# Patient Record
Sex: MALE | Race: WHITE | Employment: FULL TIME | URBAN - METROPOLITAN AREA
[De-identification: names, ages, dates, MRNs, and addresses within clinical notes are randomized per-mention and may not be internally consistent; named-entity substitution may affect disease eponyms.]

---

## 2017-08-10 ENCOUNTER — OFFICE VISIT (OUTPATIENT)
Dept: INTERNAL MEDICINE CLINIC | Age: 32
End: 2017-08-10

## 2017-08-10 VITALS
DIASTOLIC BLOOD PRESSURE: 85 MMHG | SYSTOLIC BLOOD PRESSURE: 125 MMHG | HEIGHT: 74 IN | HEART RATE: 64 BPM | RESPIRATION RATE: 18 BRPM | TEMPERATURE: 97.7 F | OXYGEN SATURATION: 100 % | BODY MASS INDEX: 28.38 KG/M2 | WEIGHT: 221.1 LBS

## 2017-08-10 DIAGNOSIS — Z00.00 ANNUAL PHYSICAL EXAM: Primary | ICD-10-CM

## 2017-08-10 DIAGNOSIS — K90.0 TRANSIENT GLUTEN SENSITIVITY: ICD-10-CM

## 2017-08-10 DIAGNOSIS — R79.89 LOW TESTOSTERONE LEVEL IN MALE: ICD-10-CM

## 2017-08-10 RX ORDER — GLUCOSAMINE SULFATE 1500 MG
POWDER IN PACKET (EA) ORAL DAILY
COMMUNITY
End: 2019-12-04

## 2017-08-10 RX ORDER — CALCIUM/MAGNESIUM/ZINC 333-133-5
TABLET ORAL DAILY
COMMUNITY
End: 2021-01-20 | Stop reason: ALTCHOICE

## 2017-08-10 RX ORDER — MULTIVITAMIN WITH IRON
1 TABLET ORAL DAILY
COMMUNITY
End: 2019-12-04

## 2017-08-10 NOTE — MR AVS SNAPSHOT
Visit Information Date & Time Provider Department Dept. Phone Encounter #  
 8/10/2017  8:00 AM Suzi Mueller MD [facility] and [address] [place] [address] [phone] [id number] Upcoming Health Maintenance Date Due DTaP/Tdap/Td series (1 - Tdap) 10/30/2008 INFLUENZA AGE 9 TO ADULT 8/1/2017 Allergies as of 8/10/2017  Review Complete On: 8/10/2017 By: Krys Banuelos No Known Allergies Current Immunizations  Never Reviewed No immunizations on file. Not reviewed this visit You Were Diagnosed With   
  
 Codes Comments Annual physical exam    -  Primary ICD-10-CM: Z00.00 ICD-9-CM: V70.0 Transient gluten sensitivity     ICD-10-CM: K90.0 ICD-9-CM: 995.3 Low testosterone level in male     ICD-10-CM: E29.1 ICD-9-CM: 257.2 Vitals BP Pulse Temp Resp Height(growth percentile) Weight(growth percentile) 125/85 (BP 1 Location: Right arm, BP Patient Position: Sitting) 64 97.7 °F (36.5 °C) (Oral) 18 6' 2\" (1.88 m) 221 lb 1.6 oz (100.3 kg) SpO2 BMI Smoking Status 100% 28.39 kg/m2 Never Smoker BMI and BSA Data Body Mass Index Body Surface Area  
 28.39 kg/m 2 2.29 m 2 Preferred Pharmacy Pharmacy Name Phone [facility] [address] [place] [address] [phone] Your Updated Medication List  
  
   
This list is accurate as of: 8/10/17  9:10 AM.  Always use your most recent med list.  
  
  
  
  
 calcium-magnesium-zinc 333-133-5 mg Tab Take  by mouth. DAILY MULTI-VITAMINS/IRON tablet Generic drug:  multivitamin with iron Take 1 Tab by mouth daily. VITAMIN D3 1,000 unit Cap Generic drug:  cholecalciferol Take  by mouth daily. We Performed the Following ALLERGEN PROFILE, FOOD-BASIC [id number] CPT(R)] ALLERGEN PROFILE, FOOD-SPICE [id number] CPT(R)] ALLERGEN, GLUTEN AB, IGE, QT [[id number] Custom] CBC WITH AUTOMATED DIFF [17147 CPT(R)] LIPID PANEL [71238 CPT(R)] METABOLIC PANEL, COMPREHENSIVE [57858 CPT(R)] DE COLLECTION VENOUS BLOOD,VENIPUNCTURE [id number] CPT(R)] TESTOSTERONE, FREE & TOTAL [32588 CPT(R)] TSH 3RD GENERATION [44707 CPT(R)] VITAMIN D, 25 HYDROXY [id number] CPT(R)] Patient Instructions Body Mass Index: Care Instructions Your Care Instructions Body mass index (BMI) can help you see if your weight is raising your risk for health problems. It uses a formula to compare how much you weigh with how tall you are. · A BMI lower than 18.5 is considered underweight. · A BMI between 18.5 and 24.9 is considered healthy. · A BMI between 25 and 29.9 is considered overweight. A BMI of 30 or higher is considered obese. · Your Body mass index is 28.39 kg/(m^2). Wt Readings from Last 3 Encounters:  
08/10/17 221 lb 1.6 oz (100.3 kg) · If your BMI is in the normal range, it means that you have a lower risk for weight-related health problems. If your BMI is in the overweight or obese range, you may be at increased risk for weight-related health problems, such as high blood pressure, heart disease, stroke, arthritis or joint pain, and diabetes. If your BMI is in the underweight range, you may be at increased risk for health problems such as fatigue, lower protection (immunity) against illness, muscle loss, bone loss, hair loss, and hormone problems. BMI is just one measure of your risk for weight-related health problems. You may be at higher risk for health problems if you are not active, you eat an unhealthy diet, or you drink too much alcohol or use tobacco products. Follow-up care is a key part of your treatment and safety. Be sure to make and go to all appointments, and call your doctor if you are having problems. It's also a good idea to know your test results and keep a list of the medicines you take. How can you care for yourself at home? · Practice healthy eating habits. This includes eating plenty of fruits, vegetables, whole grains, lean protein, and low-fat dairy. · If your doctor recommends it, get more exercise. Walking is a good choice. Bit by bit, increase the amount you walk every day. Try for at least 30 minutes on most days of the week. · Do not smoke. Smoking can increase your risk for health problems. If you need help quitting, talk to your doctor about stop-smoking programs and medicines. These can increase your chances of quitting for good. · Limit alcohol to 2 drinks a day for men and 1 drink a day for women. Too much alcohol can cause health problems. If you have a BMI higher than 25 · Your doctor may do other tests to check your risk for weight-related health problems. This may include measuring the distance around your waist. A waist measurement of more than 40 inches in men or 35 inches in women can increase the risk of weight-related health problems. · Talk with your doctor about steps you can take to stay healthy or improve your health. You may need to make lifestyle changes to lose weight and stay healthy, such as changing your diet and getting regular exercise. If you have a BMI lower than 18.5 · Your doctor may do other tests to check your risk for health problems. · Talk with your doctor about steps you can take to stay healthy or improve your health. You may need to make lifestyle changes to gain or maintain weight and stay healthy, such as getting more healthy foods in your diet and doing exercises to build muscle. Where can you learn more? Go to http://destinee-aurelia.info/. Enter S176 in the search box to learn more about \"Body Mass Index: Care Instructions. \" Current as of: January 23, 2017 Content Version: 11.3 © 0855-6335 [employer].  Care instructions adapted under license by [employer] (which disclaims liability or warranty for this information). If you have questions about a medical condition or this instruction, always ask your healthcare professional. [address] any warranty or liability for your use of this information. Introducing [facility]! Amber Colindres introduces [employer] patient portal. Now you can access parts of your medical record, email your doctor's office, and request medication refills online. 1. In your internet browser, go to [email or web address] 2. Click on the First Time User? Click Here link in the Sign In box. You will see the New Member Sign Up page. 3. Enter your Casinity Access Code exactly as it appears below. You will not need to use this code after youve completed the sign-up process. If you do not sign up before the expiration date, you must request a new code. · Casinity Access Code: [email or web address] Expires: 11/8/2017  9:10 AM 
 
4. Enter the last four digits of your Social Security Number (xxxx) and Date of Birth (mm/dd/yyyy) as indicated and click Submit. You will be taken to the next sign-up page. 5. Create a Casinity ID. This will be your Casinity login ID and cannot be changed, so think of one that is secure and easy to remember. 6. Create a Casinity password. You can change your password at any time. 7. Enter your Password Reset Question and Answer. This can be used at a later time if you forget your password. 8. Enter your e-mail address. You will receive e-mail notification when new information is available in [address]. 9. Click Sign Up. You can now view and download portions of your medical record. 10. Click the Download Summary menu link to download a portable copy of your medical information. If you have questions, please visit the Frequently Asked Questions section of the Casinity website. Remember, [employer]inity is NOT to be used for urgent needs. For medical emergencies, dial 911. Now available from your iPhone and Android! Please provide this summary of care documentation to your next provider. Your primary care clinician is listed as Chele Smith. If you have any questions after today's visit, please call [phone].

## 2017-08-10 NOTE — PATIENT INSTRUCTIONS
Body Mass Index: Care Instructions  Your Care Instructions    Body mass index (BMI) can help you see if your weight is raising your risk for health problems. It uses a formula to compare how much you weigh with how tall you are. · A BMI lower than 18.5 is considered underweight. · A BMI between 18.5 and 24.9 is considered healthy. · A BMI between 25 and 29.9 is considered overweight. A BMI of 30 or higher is considered obese. · Your Body mass index is 28.39 kg/(m^2). Wt Readings from Last 3 Encounters:   08/10/17 221 lb 1.6 oz (100.3 kg)   ·     If your BMI is in the normal range, it means that you have a lower risk for weight-related health problems. If your BMI is in the overweight or obese range, you may be at increased risk for weight-related health problems, such as high blood pressure, heart disease, stroke, arthritis or joint pain, and diabetes. If your BMI is in the underweight range, you may be at increased risk for health problems such as fatigue, lower protection (immunity) against illness, muscle loss, bone loss, hair loss, and hormone problems. BMI is just one measure of your risk for weight-related health problems. You may be at higher risk for health problems if you are not active, you eat an unhealthy diet, or you drink too much alcohol or use tobacco products. Follow-up care is a key part of your treatment and safety. Be sure to make and go to all appointments, and call your doctor if you are having problems. It's also a good idea to know your test results and keep a list of the medicines you take. How can you care for yourself at home? · Practice healthy eating habits. This includes eating plenty of fruits, vegetables, whole grains, lean protein, and low-fat dairy. · If your doctor recommends it, get more exercise. Walking is a good choice. Bit by bit, increase the amount you walk every day. Try for at least 30 minutes on most days of the week. · Do not smoke.  Smoking can increase your risk for health problems. If you need help quitting, talk to your doctor about stop-smoking programs and medicines. These can increase your chances of quitting for good. · Limit alcohol to 2 drinks a day for men and 1 drink a day for women. Too much alcohol can cause health problems. If you have a BMI higher than 25  · Your doctor may do other tests to check your risk for weight-related health problems. This may include measuring the distance around your waist. A waist measurement of more than 40 inches in men or 35 inches in women can increase the risk of weight-related health problems. · Talk with your doctor about steps you can take to stay healthy or improve your health. You may need to make lifestyle changes to lose weight and stay healthy, such as changing your diet and getting regular exercise. If you have a BMI lower than 18.5  · Your doctor may do other tests to check your risk for health problems. · Talk with your doctor about steps you can take to stay healthy or improve your health. You may need to make lifestyle changes to gain or maintain weight and stay healthy, such as getting more healthy foods in your diet and doing exercises to build muscle. Where can you learn more? Go to http://destinee-aurelia.info/. Enter S176 in the search box to learn more about \"Body Mass Index: Care Instructions. \"  Current as of: January 23, 2017  Content Version: 11.3  © 5619-1283 [employer]. Care instructions adapted under license by [employer] (which disclaims liability or warranty for this information). If you have questions about a medical condition or this instruction, always ask your healthcare professional. [place] [address] any warranty or liability for your use of this information.

## 2017-08-10 NOTE — PROGRESS NOTES
Mr. Lorenza Yang is a 36y.o. year old male who had concerns including Establish Care and Complete Physical.    HPI:  Chief Complaint   Patient presents with   [facility].    Complete Physical     Concerned with foods may be causing GI upset. Patient would like his testosterone and food allergies tested. Patient denies depression. No past medical history on file. Current Outpatient Prescriptions   Medication Sig Dispense    multivitamin with iron (DAILY MULTI-VITAMINS/IRON) tablet Take 1 Tab by mouth daily.  calcium-magnesium-zinc 333-133-5 mg tab Take  by mouth.  cholecalciferol (VITAMIN D3) 1,000 unit cap Take  by mouth daily. No current facility-administered medications for this visit. Reviewed PmHx, RxHx, FmHx, SocHx, AllgHx and updated and dated in the chart. ROS: Negative except for BOLD  General: fever, chills, fatigue  Respiratory: cough, SOB, wheezing  Cardiovascular:  CP, palpitation, WHITTEN, edema   Gastrointestinal: N/V/D, bleeding  Genito-Urinary: dysuria, hematuria  Musculoskeletal: muscle weakness, pain, swelling    OBJECTIVE:   Visit Vitals    /85 (BP 1 Location: Right arm, BP Patient Position: Sitting)    Pulse 64    Temp 97.7 °F (36.5 °C) (Oral)    Resp 18    Ht 6' 2\" (1.88 m)    Wt 221 lb 1.6 oz (100.3 kg)    SpO2 100%    BMI 28.39 kg/m2     GEN: The patient appears well, alert, oriented x 3, in no distress. ENT: bilateral TM and canal normal.  Neck supple. No adenopathy or thyromegaly. HEIDI. Lungs: clear bilaterally, good air entry, no wheezes, rhonchi or rales. Cardiovascular: regular rate and rhythm. S1 and S2 normal, no murmurs,  Abdomen: + BS, soft without tenderness, guarding, rebound, mass or organomegaly. Extremities: no edema, normal peripheral pulses. Neurological: normal, gross sensory and motor in tact without focal findings. Assessment/ Plan:       ICD-10-CM ICD-9-CM    1.  Annual physical exam Z00.00 V70.0 multivitamin with iron (DAILY MULTI-VITAMINS/IRON) tablet      calcium-magnesium-zinc 333-133-5 mg tab      cholecalciferol (VITAMIN D3) 1,000 unit cap      CBC WITH AUTOMATED DIFF      LIPID PANEL      METABOLIC PANEL, COMPREHENSIVE      KY COLLECTION VENOUS BLOOD,VENIPUNCTURE      TSH 3RD GENERATION      VITAMIN D, 25 HYDROXY      TESTOSTERONE, FREE & TOTAL   2. Transient gluten sensitivity K90.0 995.3 multivitamin with iron (DAILY MULTI-VITAMINS/IRON) tablet      calcium-magnesium-zinc 333-133-5 mg tab      cholecalciferol (VITAMIN D3) 1,000 unit cap      CBC WITH AUTOMATED DIFF      LIPID PANEL      METABOLIC PANEL, COMPREHENSIVE      KY COLLECTION VENOUS BLOOD,VENIPUNCTURE      TSH 3RD GENERATION      VITAMIN D, 25 HYDROXY      TESTOSTERONE, FREE & TOTAL      ALLERGEN PROFILE, FOOD-BASIC      ALLERGEN PROFILE, FOOD-SPICE      ALLERGEN, GLUTEN AB, IGE, QT   3. Low testosterone level in male E29.1 257.2 multivitamin with iron (DAILY MULTI-VITAMINS/IRON) tablet      calcium-magnesium-zinc 333-133-5 mg tab      cholecalciferol (VITAMIN D3) 1,000 unit cap      CBC WITH AUTOMATED DIFF      LIPID PANEL      METABOLIC PANEL, COMPREHENSIVE      KY COLLECTION VENOUS BLOOD,VENIPUNCTURE      TSH 3RD GENERATION      VITAMIN D, 25 HYDROXY      TESTOSTERONE, FREE & TOTAL           I have discussed the diagnosis with the patient and the intended plan as seen in the above orders. The patient has received an after-visit summary and questions were answered concerning future plans. Medication Side Effects and Warnings were discussed with patient.     Follow-up Disposition: Not on R Robbie Weller MD

## 2017-08-18 LAB
25(OH)D3+25(OH)D2 SERPL-MCNC: 31.9 NG/ML (ref 30–100)
ALBUMIN SERPL-MCNC: 5 G/DL (ref 3.5–5.5)
ALBUMIN/GLOB SERPL: 2 {RATIO} (ref 1.2–2.2)
ALP SERPL-CCNC: 71 IU/L (ref 39–117)
ALT SERPL-CCNC: 28 IU/L (ref 0–44)
AST SERPL-CCNC: 25 IU/L (ref 0–40)
BASOPHILS # BLD AUTO: 0 X10E3/UL (ref 0–0.2)
BASOPHILS NFR BLD AUTO: 0 %
BILIRUB SERPL-MCNC: 0.4 MG/DL (ref 0–1.2)
BUN SERPL-MCNC: 19 MG/DL (ref 6–20)
BUN/CREAT SERPL: 17 (ref 9–20)
CALCIUM SERPL-MCNC: 10.2 MG/DL (ref 8.7–10.2)
CHLORIDE SERPL-SCNC: 102 MMOL/L (ref 96–106)
CHOLEST SERPL-MCNC: 192 MG/DL (ref 100–199)
CO2 SERPL-SCNC: 23 MMOL/L (ref 18–29)
CODFISH IGE QN: <0.1 KU/L
COW MILK IGE QN: <0.1 KU/L
CREAT SERPL-MCNC: 1.1 MG/DL (ref 0.76–1.27)
EGG WHITE IGE QN: <0.1 KU/L
EOSINOPHIL # BLD AUTO: 0.1 X10E3/UL (ref 0–0.4)
EOSINOPHIL NFR BLD AUTO: 2 %
ERYTHROCYTE [DISTWIDTH] IN BLOOD BY AUTOMATED COUNT: 12.8 % (ref 12.3–15.4)
GLOBULIN SER CALC-MCNC: 2.5 G/DL (ref 1.5–4.5)
GLUCOSE SERPL-MCNC: 82 MG/DL (ref 65–99)
GLUTEN IGE QN: <0.1 KU/L
HCT VFR BLD AUTO: 47.5 % (ref 37.5–51)
HDLC SERPL-MCNC: 35 MG/DL
HGB BLD-MCNC: 16.7 G/DL (ref 12.6–17.7)
IMM GRANULOCYTES # BLD: 0 X10E3/UL (ref 0–0.1)
IMM GRANULOCYTES NFR BLD: 0 %
LDLC SERPL CALC-MCNC: 81 MG/DL (ref 0–99)
LYMPHOCYTES # BLD AUTO: 1.9 X10E3/UL (ref 0.7–3.1)
LYMPHOCYTES NFR BLD AUTO: 31 %
Lab: ABNORMAL
MCH RBC QN AUTO: 32.4 PG (ref 26.6–33)
MCHC RBC AUTO-ENTMCNC: 35.2 G/DL (ref 31.5–35.7)
MCV RBC AUTO: 92 FL (ref 79–97)
MONOCYTES # BLD AUTO: 0.5 X10E3/UL (ref 0.1–0.9)
MONOCYTES NFR BLD AUTO: 9 %
NEUTROPHILS # BLD AUTO: 3.4 X10E3/UL (ref 1.4–7)
NEUTROPHILS NFR BLD AUTO: 58 %
PEANUT IGE QN: <0.1 KU/L
PLATELET # BLD AUTO: 171 X10E3/UL (ref 150–379)
POTASSIUM SERPL-SCNC: 3.9 MMOL/L (ref 3.5–5.2)
PROT SERPL-MCNC: 7.5 G/DL (ref 6–8.5)
RBC # BLD AUTO: 5.16 X10E6/UL (ref 4.14–5.8)
SODIUM SERPL-SCNC: 144 MMOL/L (ref 134–144)
SOYBEAN IGE QN: 0.48 KU/L
SPECIMEN STATUS REPORT, ROLRST: NORMAL
TESTOST FREE SERPL-MCNC: NORMAL PG/ML
TESTOST SERPL-MCNC: 300 NG/DL (ref 264–916)
TRIGL SERPL-MCNC: 380 MG/DL (ref 0–149)
TSH SERPL DL<=0.005 MIU/L-ACNC: 2.65 UIU/ML (ref 0.45–4.5)
VLDLC SERPL CALC-MCNC: 76 MG/DL (ref 5–40)
WBC # BLD AUTO: 6 X10E3/UL (ref 3.4–10.8)
WHEAT IGE QN: <0.1 KU/L

## 2017-09-06 ENCOUNTER — TELEPHONE (OUTPATIENT)
Dept: INTERNAL MEDICINE CLINIC | Age: 32
End: 2017-09-06

## 2017-12-11 ENCOUNTER — OFFICE VISIT (OUTPATIENT)
Dept: INTERNAL MEDICINE CLINIC | Age: 32
End: 2017-12-11

## 2017-12-11 VITALS
RESPIRATION RATE: 18 BRPM | HEIGHT: 74 IN | OXYGEN SATURATION: 98 % | DIASTOLIC BLOOD PRESSURE: 85 MMHG | TEMPERATURE: 97.9 F | HEART RATE: 63 BPM | BODY MASS INDEX: 28.66 KG/M2 | SYSTOLIC BLOOD PRESSURE: 129 MMHG | WEIGHT: 223.3 LBS

## 2017-12-11 DIAGNOSIS — J02.9 SORE THROAT: ICD-10-CM

## 2017-12-11 DIAGNOSIS — J34.89 SINUS PRESSURE: Primary | ICD-10-CM

## 2017-12-11 RX ORDER — AMOXICILLIN 875 MG/1
875 TABLET, FILM COATED ORAL 2 TIMES DAILY
Qty: 14 TAB | Refills: 0 | Status: SHIPPED | OUTPATIENT
Start: 2017-12-11 | End: 2017-12-18

## 2017-12-11 NOTE — PROGRESS NOTES
Mr. Marisol Martínez is a 29y.o. year old male who had concerns including Sore Throat. HPI:  Chief Complaint   Patient presents with    Sore Throat     rm 5 patient believes he may have a sinus infection      Colorful drainage since Thursday. Fatigue, weakness. No fevers. History reviewed. No pertinent past medical history. Current Outpatient Prescriptions   Medication Sig Dispense    oxymetazoline (AFRIN NO DRIP,OXYMETAZOLIN,) 0.05 % mist 1 Spray by Nasal route two (2) times a day for 3 days. 15 mL    amoxicillin (AMOXIL) 875 mg tablet Take 1 Tab by mouth two (2) times a day for 7 days. 14 Tab    multivitamin with iron (DAILY MULTI-VITAMINS/IRON) tablet Take 1 Tab by mouth daily.  calcium-magnesium-zinc 333-133-5 mg tab Take  by mouth.  cholecalciferol (VITAMIN D3) 1,000 unit cap Take  by mouth daily. No current facility-administered medications for this visit. Reviewed PmHx, RxHx, FmHx, SocHx, AllgHx and updated and dated in the chart. ROS: Negative except for BOLD  General: fever, chills, fatigue  Respiratory: cough, SOB, wheezing  Cardiovascular:  CP, palpitation, WHITTEN, edema   Gastrointestinal: N/V/D, bleeding  Genito-Urinary: dysuria, hematuria  Musculoskeletal: muscle weakness, pain, swelling    OBJECTIVE:   Visit Vitals    /85 (BP 1 Location: Right arm, BP Patient Position: Sitting)    Pulse 63    Temp 97.9 °F (36.6 °C) (Oral)    Resp 18    Ht 6' 2\" (1.88 m)    Wt 223 lb 4.8 oz (101.3 kg)    SpO2 98%    BMI 28.67 kg/m2     GEN: The patient appears well, alert, oriented x 3, in no distress. ENT: bilateral TM and canal normal.  Neck supple. Min tender, shoddy, mobile ant cervical adenopathy, no thyromegaly. No conjunctivitis. Maxillary sinus tenderness present. Nasal turbinates erythema, mild swelling  Lungs: clear bilaterally, good air entry, no wheezes, rhonchi or rales. Cardiovascular: regular rate and rhythm.  S1 and S2 normal, no murmurs,  Abdomen: + BS, soft without tenderness, guarding, rebound, mass or organomegaly. Extremities: no edema, normal peripheral pulses. No cyanosis      Assessment/ Plan:       ICD-10-CM ICD-9-CM    1. Sinus pressure J34.89 478.19 oxymetazoline (AFRIN NO DRIP,OXYMETAZOLIN,) 0.05 % mist      amoxicillin (AMOXIL) 875 mg tablet   2. Sore throat J02.9 462        Sx care, afrin x 3 days, if persist or worsens, start abx then. No red flags. I have discussed the diagnosis with the patient and the intended plan as seen in the above orders. The patient has received an after-visit summary and questions were answered concerning future plans. Medication Side Effects and Warnings were discussed with patient.     Follow-up Disposition: Not on R Robbie Weller MD

## 2017-12-11 NOTE — MR AVS SNAPSHOT
Visit Information Date & Time Provider Department Dept. Phone Encounter #  
 12/11/2017  4:30 PM Quinn Coyne MD [facility] Sports Medicine and [address] [place] [address] [phone] [id number] Upcoming Health Maintenance Date Due DTaP/Tdap/Td series (2 - Td) 12/11/2027 Allergies as of 12/11/2017  Review Complete On: 12/11/2017 By: Rene Campuzano No Known Allergies Current Immunizations  Never Reviewed No immunizations on file. Not reviewed this visit You Were Diagnosed With   
  
 Codes Comments Sinus pressure    -  Primary ICD-10-CM: C33.40 ICD-9-CM: 478.19 Sore throat     ICD-10-CM: J02.9 ICD-9-CM: 947 Vitals BP Pulse Temp Resp Height(growth percentile) Weight(growth percentile) 129/85 (BP 1 Location: Right arm, BP Patient Position: Sitting) 63 97.9 °F (36.6 °C) (Oral) 18 6' 2\" (1.88 m) 223 lb 4.8 oz (101.3 kg) SpO2 BMI Smoking Status 98% 28.67 kg/m2 Never Smoker BMI and BSA Data Body Mass Index Body Surface Area  
 28.67 kg/m 2 2.3 m 2 Preferred Pharmacy Pharmacy Name Phone [facility] [address] [place] [address] 37 [address] [facility] [phone] Your Updated Medication List  
  
   
This list is accurate as of: 12/11/17  4:53 PM.  Always use your most recent med list.  
  
  
  
  
 amoxicillin 875 mg tablet Commonly known as:  AMOXIL Take 1 Tab by mouth two (2) times a day for 7 days. calcium-magnesium-zinc 333-133-5 mg Tab Take  by mouth. DAILY MULTI-VITAMINS/IRON tablet Generic drug:  multivitamin with iron Take 1 Tab by mouth daily. oxymetazoline 0.05 % Mist  
Commonly known as:  AFRIN NO DRIP(OXYMETAZOLIN) 1 Spray by Nasal route two (2) times a day for 3 days. VITAMIN D3 1,000 unit Cap Generic drug:  cholecalciferol Take  by mouth daily. Prescriptions Sent to Pharmacy Refills oxymetazoline (AFRIN NO DRIP,OXYMETAZOLIN,) 0.05 % mist 0 Si Spray by Nasal route two (2) times a day for 3 days. Class: Normal  
 Pharmacy: [address] AT [address] [facility] Ph #: [phone] Route: Nasal  
 amoxicillin (AMOXIL) 875 mg tablet 0 Sig: Take 1 Tab by mouth two (2) times a day for 7 days. Class: Normal  
 Pharmacy: [address] AT [address] [facility] Ph #: [phone] Route: Oral  
  
Patient Instructions Saline Nasal Washes: Care Instructions Your Care Instructions Saline nasal washes help keep the nasal passages open by washing out thick or dried mucus. This simple remedy can help relieve symptoms of allergies, sinusitis, and colds. It also can make the nose feel more comfortable by keeping the mucous membranes moist. You may notice a little burning sensation in your nose the first few times you use the solution, but this usually gets better in a few days. Follow-up care is a key part of your treatment and safety. Be sure to make and go to all appointments, and call your doctor if you are having problems. It's also a good idea to know your test results and keep a list of the medicines you take. How can you care for yourself at home? · You can buy premixed saline solution in a squeeze bottle or other sinus rinse products at a drugstore. Read and follow the instructions on the label. · You also can make your own saline solution by adding 1 teaspoon of salt and 1 teaspoon of baking soda to 2 cups of distilled water. · If you use a homemade solution, pour a small amount into a clean bowl. Using a rubber bulb syringe, squeeze the syringe and place the tip in the salt water. Pull a small amount of the salt water into the syringe by relaxing your hand. · Sit down with your head tilted slightly back. Do not lie down.  Put the tip of the bulb syringe or the squeeze bottle a little way into one of your nostrils. Gently drip or squirt a few drops into the nostril. Repeat with the other nostril. Some sneezing and gagging are normal at first. 
· Gently blow your nose. · Wipe the syringe or bottle tip clean after each use. · Repeat this 2 or 3 times a day. · Use nasal washes gently if you have nosebleeds often. When should you call for help? Watch closely for changes in your health, and be sure to contact your doctor if: 
? · You often get nosebleeds. ? · You have problems doing the nasal washes. Where can you learn more? Go to http://destinee-aurelia.info/. Enter [id number] in the search box to learn more about \"Saline Nasal Washes: Care Instructions. \" Current as of: May 12, 2017 Content Version: 11.4 © 9106-7646 [employer]. Care instructions adapted under license by [employer] (which disclaims liability or warranty for this information). If you have questions about a medical condition or this instruction, always ask your healthcare professional. [place] [address] any warranty or liability for your use of this information. Sinusitis: Care Instructions Your Care Instructions Sinusitis is an infection of the lining of the sinus cavities in your head. Sinusitis often follows a cold. It causes pain and pressure in your head and face. In most cases, sinusitis gets better on its own in 1 to 2 weeks. But some mild symptoms may last for several weeks. Sometimes antibiotics are needed. Follow-up care is a key part of your treatment and safety. Be sure to make and go to all appointments, and call your doctor if you are having problems. It's also a good idea to know your test results and keep a list of the medicines you take. How can you care for yourself at home?  
· Take an over-the-counter pain medicine, such as acetaminophen (Tylenol), ibuprofen (Advil, Motrin), or naproxen (Aleve). Read and follow all instructions on the label. · If the doctor prescribed antibiotics, take them as directed. Do not stop taking them just because you feel better. You need to take the full course of antibiotics. · Be careful when taking over-the-counter cold or flu medicines and Tylenol at the same time. Many of these medicines have acetaminophen, which is Tylenol. Read the labels to make sure that you are not taking more than the recommended dose. Too much acetaminophen (Tylenol) can be harmful. · Breathe warm, moist air from a steamy shower, a hot bath, or a sink filled with hot water. Avoid cold, dry air. Using a humidifier in your home may help. Follow the directions for cleaning the machine. · Use saline (saltwater) nasal washes to help keep your nasal passages open and wash out mucus and bacteria. You can buy saline nose drops at a grocery store or drugstore. Or you can make your own at home by adding 1 teaspoon of salt and 1 teaspoon of baking soda to 2 cups of distilled water. If you make your own, fill a bulb syringe with the solution, insert the tip into your nostril, and squeeze gently. Brielle Hals your nose. · Put a hot, wet towel or a warm gel pack on your face 3 or 4 times a day for 5 to 10 minutes each time. · Try a decongestant nasal spray like oxymetazoline (Afrin). Do not use it for more than 3 days in a row. Using it for more than 3 days can make your congestion worse. When should you call for help? Call your doctor now or seek immediate medical care if: 
? · You have new or worse swelling or redness in your face or around your eyes. ? · You have a new or higher fever. ? Watch closely for changes in your health, and be sure to contact your doctor if: 
? · You have new or worse facial pain. ? · The mucus from your nose becomes thicker (like pus) or has new blood in it. ? · You are not getting better as expected. Where can you learn more? Go to http://destinee-aurelia.info/. Enter C949 in the search box to learn more about \"Sinusitis: Care Instructions. \" Current as of: May 12, 2017 Content Version: 11.4 © 9896-2054 [employer]. Care instructions adapted under license by [employer] (which disclaims liability or warranty for this information). If you have questions about a medical condition or this instruction, always ask your healthcare professional. [place] [address] any warranty or liability for your use of this information. Introducing [facility] & HEALTH SERVICES! [employer] introduces EndoEvolution patient portal. Now you can access parts of your medical record, email your doctor's office, and request medication refills online. 1. In your internet browser, go to [email or web address] 2. Click on the First Time User? Click Here link in the Sign In box. You will see the New Member Sign Up page. 3. Enter your EndoEvolution Access Code exactly as it appears below. You will not need to use this code after youve completed the sign-up process. If you do not sign up before the expiration date, you must request a new code. · EndoEvolution Access Code: [email or web address] Expires: 3/11/2018  4:53 PM 
 
4. Enter the last four digits of your Social Security Number (xxxx) and Date of Birth (mm/dd/yyyy) as indicated and click Submit. You will be taken to the next sign-up page. 5. Create a EndoEvolution ID. This will be your EndoEvolution login ID and cannot be changed, so think of one that is secure and easy to remember. 6. Create a EndoEvolution password. You can change your password at any time. 7. Enter your Password Reset Question and Answer. This can be used at a later time if you forget your password. 8. Enter your e-mail address. You will receive e-mail notification when new information is available in [address] 9. Click Sign Up. You can now view and download portions of your medical record. 10. Click the Download Summary menu link to download a portable copy of your medical information. If you have questions, please visit the Frequently Asked Questions section of the "2,10E+07" website. Remember, "2,10E+07" is NOT to be used for urgent needs. For medical emergencies, dial 911. Now available from your iPhone and Android! Please provide this summary of care documentation to your next provider. Your primary care clinician is listed as Kaley White. If you have any questions after today's visit, please call [phone].

## 2017-12-11 NOTE — PATIENT INSTRUCTIONS
Saline Nasal Washes: Care Instructions  Your Care Instructions  Saline nasal washes help keep the nasal passages open by washing out thick or dried mucus. This simple remedy can help relieve symptoms of allergies, sinusitis, and colds. It also can make the nose feel more comfortable by keeping the mucous membranes moist. You may notice a little burning sensation in your nose the first few times you use the solution, but this usually gets better in a few days. Follow-up care is a key part of your treatment and safety. Be sure to make and go to all appointments, and call your doctor if you are having problems. It's also a good idea to know your test results and keep a list of the medicines you take. How can you care for yourself at home? · You can buy premixed saline solution in a squeeze bottle or other sinus rinse products at a drugstore. Read and follow the instructions on the label. · You also can make your own saline solution by adding 1 teaspoon of salt and 1 teaspoon of baking soda to 2 cups of distilled water. · If you use a homemade solution, pour a small amount into a clean bowl. Using a rubber bulb syringe, squeeze the syringe and place the tip in the salt water. Pull a small amount of the salt water into the syringe by relaxing your hand. · Sit down with your head tilted slightly back. Do not lie down. Put the tip of the bulb syringe or the squeeze bottle a little way into one of your nostrils. Gently drip or squirt a few drops into the nostril. Repeat with the other nostril. Some sneezing and gagging are normal at first.  · Gently blow your nose. · Wipe the syringe or bottle tip clean after each use. · Repeat this 2 or 3 times a day. · Use nasal washes gently if you have nosebleeds often. When should you call for help? Watch closely for changes in your health, and be sure to contact your doctor if:  ? · You often get nosebleeds. ? · You have problems doing the nasal washes.    Where can you learn more? Go to http://destinee-aurelia.info/. Enter [id number] [id number] [phone] in the search box to learn more about \"Saline Nasal Washes: Care Instructions. \"  Current as of: May 12, 2017  Content Version: 11.4  © 8000-9996 [employer]. Care instructions adapted under license by [employer] (which disclaims liability or warranty for this information). If you have questions about a medical condition or this instruction, always ask your healthcare professional. [address] any warranty or liability for your use of this information. Sinusitis: Care Instructions  Your Care Instructions    Sinusitis is an infection of the lining of the sinus cavities in your head. Sinusitis often follows a cold. It causes pain and pressure in your head and face. In most cases, sinusitis gets better on its own in 1 to 2 weeks. But some mild symptoms may last for several weeks. Sometimes antibiotics are needed. Follow-up care is a key part of your treatment and safety. Be sure to make and go to all appointments, and call your doctor if you are having problems. It's also a good idea to know your test results and keep a list of the medicines you take. How can you care for yourself at home? · Take an over-the-counter pain medicine, such as acetaminophen (Tylenol), ibuprofen (Advil, Motrin), or naproxen (Aleve). Read and follow all instructions on the label. · If the doctor prescribed antibiotics, take them as directed. Do not stop taking them just because you feel better. You need to take the full course of antibiotics. · Be careful when taking over-the-counter cold or flu medicines and Tylenol at the same time. Many of these medicines have acetaminophen, which is Tylenol. Read the labels to make sure that you are not taking more than the recommended dose. Too much acetaminophen (Tylenol) can be harmful.   · Breathe warm, moist air from a steamy shower, a hot bath, or a sink filled with hot water. Avoid cold, dry air. Using a humidifier in your home may help. Follow the directions for cleaning the machine. · Use saline (saltwater) nasal washes to help keep your nasal passages open and wash out mucus and bacteria. You can buy saline nose drops at a grocery store or drugstore. Or you can make your own at home by adding 1 teaspoon of salt and 1 teaspoon of baking soda to 2 cups of distilled water. If you make your own, fill a bulb syringe with the solution, insert the tip into your nostril, and squeeze gently. Coralyn [place] your nose. · Put a hot, wet towel or a warm gel pack on your face 3 or 4 times a day for 5 to 10 minutes each time. · Try a decongestant nasal spray like oxymetazoline (Afrin). Do not use it for more than 3 days in a row. Using it for more than 3 days can make your congestion worse. When should you call for help? Call your doctor now or seek immediate medical care if:  ? · You have new or worse swelling or redness in your face or around your eyes. ? · You have a new or higher fever. ? Watch closely for changes in your health, and be sure to contact your doctor if:  ? · You have new or worse facial pain. ? · The mucus from your nose becomes thicker (like pus) or has new blood in it. ? · You are not getting better as expected. Where can you learn more? Go to http://destinee-aurelia.info/. Enter S521 in the search box to learn more about \"Sinusitis: Care Instructions. \"  Current as of: May 12, 2017  Content Version: 11.4  © 5028-1035 [employer]. Care instructions adapted under license by [employer] (which disclaims liability or warranty for this information). If you have questions about a medical condition or this instruction, always ask your healthcare professional. [address] any warranty or liability for your use of this information.

## 2019-01-24 ENCOUNTER — HOSPITAL ENCOUNTER (INPATIENT)
Age: 34
LOS: 2 days | Discharge: HOME | DRG: 373 | End: 2019-01-27
Attending: EMERGENCY MEDICINE | Admitting: SURGERY
Payer: COMMERCIAL

## 2019-01-24 ENCOUNTER — HOSPITAL ENCOUNTER (OUTPATIENT)
Dept: CT IMAGING | Age: 34
Discharge: HOME | End: 2019-01-24
Attending: NURSE PRACTITIONER
Payer: COMMERCIAL

## 2019-01-24 ENCOUNTER — OFFICE VISIT (OUTPATIENT)
Dept: FAMILY MEDICINE CLINIC | Age: 34
End: 2019-01-24

## 2019-01-24 VITALS
DIASTOLIC BLOOD PRESSURE: 80 MMHG | TEMPERATURE: 98.7 F | RESPIRATION RATE: 16 BRPM | SYSTOLIC BLOOD PRESSURE: 135 MMHG | OXYGEN SATURATION: 99 % | HEART RATE: 75 BPM | WEIGHT: 205 LBS | HEIGHT: 74 IN | BODY MASS INDEX: 26.31 KG/M2

## 2019-01-24 DIAGNOSIS — R68.89 ABNORMAL TESTICULAR EXAM: ICD-10-CM

## 2019-01-24 DIAGNOSIS — R10.31 ACUTE BILATERAL LOWER ABDOMINAL PAIN: Primary | ICD-10-CM

## 2019-01-24 DIAGNOSIS — R10.32 ACUTE BILATERAL LOWER ABDOMINAL PAIN: ICD-10-CM

## 2019-01-24 DIAGNOSIS — K35.33 APPENDICITIS WITH ABSCESS: ICD-10-CM

## 2019-01-24 DIAGNOSIS — K37 APPENDICITIS, UNSPECIFIED APPENDICITIS TYPE: Primary | ICD-10-CM

## 2019-01-24 DIAGNOSIS — R10.31 ACUTE BILATERAL LOWER ABDOMINAL PAIN: ICD-10-CM

## 2019-01-24 DIAGNOSIS — R10.32 ACUTE BILATERAL LOWER ABDOMINAL PAIN: Primary | ICD-10-CM

## 2019-01-24 LAB
ANION GAP BLD CALC-SCNC: 15 MMOL/L (ref 10–20)
ANION GAP SERPL CALC-SCNC: 11 MMOL/L (ref 5–15)
BASOPHILS # BLD: 0 K/UL (ref 0–0.1)
BASOPHILS NFR BLD: 0 % (ref 0–1)
BILIRUB UR QL STRIP: NEGATIVE
BUN BLD-MCNC: 17 MG/DL (ref 9–20)
BUN SERPL-MCNC: 14 MG/DL (ref 6–20)
BUN/CREAT SERPL: 13 (ref 12–20)
CA-I BLD-MCNC: 1.07 MMOL/L (ref 1.12–1.32)
CALCIUM SERPL-MCNC: 9.6 MG/DL (ref 8.5–10.1)
CHLORIDE BLD-SCNC: 100 MMOL/L (ref 98–107)
CHLORIDE SERPL-SCNC: 102 MMOL/L (ref 97–108)
CO2 BLD-SCNC: 26 MMOL/L (ref 21–32)
CO2 SERPL-SCNC: 23 MMOL/L (ref 21–32)
COMMENT, HOLDF: NORMAL
COMMENT, HOLDF: NORMAL
CREAT BLD-MCNC: 0.9 MG/DL (ref 0.6–1.3)
CREAT SERPL-MCNC: 1.04 MG/DL (ref 0.7–1.3)
DIFFERENTIAL METHOD BLD: ABNORMAL
EOSINOPHIL # BLD: 0 K/UL (ref 0–0.4)
EOSINOPHIL NFR BLD: 0 % (ref 0–7)
ERYTHROCYTE [DISTWIDTH] IN BLOOD BY AUTOMATED COUNT: 11 % (ref 11.5–14.5)
GLUCOSE BLD-MCNC: 84 MG/DL (ref 65–100)
GLUCOSE SERPL-MCNC: 86 MG/DL (ref 65–100)
GLUCOSE UR-MCNC: NEGATIVE MG/DL
HCT VFR BLD AUTO: 42.2 % (ref 36.6–50.3)
HCT VFR BLD CALC: 43 % (ref 36.6–50.3)
HGB BLD-MCNC: 15.1 G/DL (ref 12.1–17)
IMM GRANULOCYTES # BLD AUTO: 0 K/UL (ref 0–0.04)
IMM GRANULOCYTES NFR BLD AUTO: 0 % (ref 0–0.5)
KETONES P FAST UR STRIP-MCNC: NEGATIVE MG/DL
LYMPHOCYTES # BLD: 1.2 K/UL (ref 0.8–3.5)
LYMPHOCYTES NFR BLD: 11 % (ref 12–49)
MCH RBC QN AUTO: 31.9 PG (ref 26–34)
MCHC RBC AUTO-ENTMCNC: 35.8 G/DL (ref 30–36.5)
MCV RBC AUTO: 89.2 FL (ref 80–99)
MONOCYTES # BLD: 1 K/UL (ref 0–1)
MONOCYTES NFR BLD: 9 % (ref 5–13)
NEUTS SEG # BLD: 9 K/UL (ref 1.8–8)
NEUTS SEG NFR BLD: 79 % (ref 32–75)
NRBC # BLD: 0 K/UL (ref 0–0.01)
NRBC BLD-RTO: 0 PER 100 WBC
PH UR STRIP: 8.5 [PH] (ref 4.6–8)
PLATELET # BLD AUTO: 192 K/UL (ref 150–400)
PMV BLD AUTO: 10.3 FL (ref 8.9–12.9)
POTASSIUM BLD-SCNC: 7.3 MMOL/L (ref 3.5–5.1)
POTASSIUM SERPL-SCNC: 3.6 MMOL/L (ref 3.5–5.1)
PROT UR QL STRIP: NEGATIVE
RBC # BLD AUTO: 4.73 M/UL (ref 4.1–5.7)
SAMPLES BEING HELD,HOLD: NORMAL
SAMPLES BEING HELD,HOLD: NORMAL
SERVICE CMNT-IMP: ABNORMAL
SODIUM BLD-SCNC: 133 MMOL/L (ref 136–145)
SODIUM SERPL-SCNC: 136 MMOL/L (ref 136–145)
SP GR UR STRIP: 1.01 (ref 1–1.03)
UA UROBILINOGEN AMB POC: ABNORMAL (ref 0.2–1)
URINALYSIS CLARITY POC: CLEAR
URINALYSIS COLOR POC: ABNORMAL
URINE BLOOD POC: NEGATIVE
URINE LEUKOCYTES POC: NEGATIVE
URINE NITRITES POC: NEGATIVE
WBC # BLD AUTO: 11.4 K/UL (ref 4.1–11.1)

## 2019-01-24 PROCEDURE — 74011000258 HC RX REV CODE- 258: Performed by: SURGERY

## 2019-01-24 PROCEDURE — 74011250636 HC RX REV CODE- 250/636: Performed by: SURGERY

## 2019-01-24 PROCEDURE — 80047 BASIC METABLC PNL IONIZED CA: CPT

## 2019-01-24 PROCEDURE — 74011000258 HC RX REV CODE- 258: Performed by: RADIOLOGY

## 2019-01-24 PROCEDURE — 99283 EMERGENCY DEPT VISIT LOW MDM: CPT

## 2019-01-24 PROCEDURE — 74011250637 HC RX REV CODE- 250/637: Performed by: SURGERY

## 2019-01-24 PROCEDURE — 74177 CT ABD & PELVIS W/CONTRAST: CPT

## 2019-01-24 PROCEDURE — 85025 COMPLETE CBC W/AUTO DIFF WBC: CPT

## 2019-01-24 PROCEDURE — 80048 BASIC METABOLIC PNL TOTAL CA: CPT

## 2019-01-24 PROCEDURE — 74011636320 HC RX REV CODE- 636/320: Performed by: RADIOLOGY

## 2019-01-24 PROCEDURE — 99218 HC RM OBSERVATION: CPT

## 2019-01-24 PROCEDURE — 96374 THER/PROPH/DIAG INJ IV PUSH: CPT

## 2019-01-24 PROCEDURE — 36415 COLL VENOUS BLD VENIPUNCTURE: CPT

## 2019-01-24 RX ORDER — NALOXONE HYDROCHLORIDE 0.4 MG/ML
0.4 INJECTION, SOLUTION INTRAMUSCULAR; INTRAVENOUS; SUBCUTANEOUS AS NEEDED
Status: DISCONTINUED | OUTPATIENT
Start: 2019-01-24 | End: 2019-01-27 | Stop reason: HOSPADM

## 2019-01-24 RX ORDER — SODIUM CHLORIDE 0.9 % (FLUSH) 0.9 %
10 SYRINGE (ML) INJECTION
Status: COMPLETED | OUTPATIENT
Start: 2019-01-24 | End: 2019-01-24

## 2019-01-24 RX ORDER — OXYCODONE AND ACETAMINOPHEN 5; 325 MG/1; MG/1
1 TABLET ORAL
Status: DISCONTINUED | OUTPATIENT
Start: 2019-01-24 | End: 2019-01-27 | Stop reason: HOSPADM

## 2019-01-24 RX ORDER — ENOXAPARIN SODIUM 100 MG/ML
40 INJECTION SUBCUTANEOUS EVERY 24 HOURS
Status: DISCONTINUED | OUTPATIENT
Start: 2019-01-25 | End: 2019-01-27 | Stop reason: HOSPADM

## 2019-01-24 RX ORDER — SODIUM CHLORIDE 0.9 % (FLUSH) 0.9 %
5-40 SYRINGE (ML) INJECTION EVERY 8 HOURS
Status: DISCONTINUED | OUTPATIENT
Start: 2019-01-24 | End: 2019-01-27 | Stop reason: HOSPADM

## 2019-01-24 RX ORDER — ACETAMINOPHEN 325 MG/1
650 TABLET ORAL
Status: DISCONTINUED | OUTPATIENT
Start: 2019-01-24 | End: 2019-01-27 | Stop reason: HOSPADM

## 2019-01-24 RX ORDER — FENTANYL CITRATE 50 UG/ML
25 INJECTION, SOLUTION INTRAMUSCULAR; INTRAVENOUS
Status: DISCONTINUED | OUTPATIENT
Start: 2019-01-24 | End: 2019-01-27 | Stop reason: HOSPADM

## 2019-01-24 RX ORDER — DIPHENHYDRAMINE HCL 25 MG
25 CAPSULE ORAL
Status: DISCONTINUED | OUTPATIENT
Start: 2019-01-24 | End: 2019-01-27 | Stop reason: HOSPADM

## 2019-01-24 RX ORDER — SODIUM CHLORIDE, SODIUM LACTATE, POTASSIUM CHLORIDE, CALCIUM CHLORIDE 600; 310; 30; 20 MG/100ML; MG/100ML; MG/100ML; MG/100ML
100 INJECTION, SOLUTION INTRAVENOUS CONTINUOUS
Status: DISCONTINUED | OUTPATIENT
Start: 2019-01-24 | End: 2019-01-27 | Stop reason: HOSPADM

## 2019-01-24 RX ORDER — ONDANSETRON 2 MG/ML
4 INJECTION INTRAMUSCULAR; INTRAVENOUS
Status: DISCONTINUED | OUTPATIENT
Start: 2019-01-24 | End: 2019-01-27 | Stop reason: HOSPADM

## 2019-01-24 RX ORDER — SODIUM CHLORIDE 0.9 % (FLUSH) 0.9 %
5-40 SYRINGE (ML) INJECTION AS NEEDED
Status: DISCONTINUED | OUTPATIENT
Start: 2019-01-24 | End: 2019-01-27 | Stop reason: HOSPADM

## 2019-01-24 RX ADMIN — FENTANYL CITRATE 25 MCG: 50 INJECTION, SOLUTION INTRAMUSCULAR; INTRAVENOUS at 19:26

## 2019-01-24 RX ADMIN — IOHEXOL 50 ML: 240 INJECTION, SOLUTION INTRATHECAL; INTRAVASCULAR; INTRAVENOUS; ORAL at 16:31

## 2019-01-24 RX ADMIN — Medication 10 ML: at 16:31

## 2019-01-24 RX ADMIN — SODIUM CHLORIDE 100 ML: 900 INJECTION, SOLUTION INTRAVENOUS at 16:31

## 2019-01-24 RX ADMIN — OXYCODONE AND ACETAMINOPHEN 1 TABLET: 5; 325 TABLET ORAL at 21:22

## 2019-01-24 RX ADMIN — IOPAMIDOL 100 ML: 755 INJECTION, SOLUTION INTRAVENOUS at 16:31

## 2019-01-24 RX ADMIN — SODIUM CHLORIDE, SODIUM LACTATE, POTASSIUM CHLORIDE, AND CALCIUM CHLORIDE 100 ML/HR: 600; 310; 30; 20 INJECTION, SOLUTION INTRAVENOUS at 20:43

## 2019-01-24 RX ADMIN — Medication 10 ML: at 20:44

## 2019-01-24 RX ADMIN — PIPERACILLIN SODIUM,TAZOBACTAM SODIUM 3.38 G: 3; .375 INJECTION, POWDER, FOR SOLUTION INTRAVENOUS at 20:43

## 2019-01-24 NOTE — PROGRESS NOTES
Chief Complaint   Patient presents with    New Patient    Abdominal Pain     pt states he has been having abdominal pain and fevers and back pains for over a week. states he has been all over the [place] with his job. may be related. \"REVIEWED RECORD IN PREPARATION FOR VISIT AND HAVE OBTAINED THE NECESSARY DOCUMENTATION\"  1. Have you been to the ER, urgent care clinic since your last visit? Hospitalized since your last visit? Yes Where: seen [facility] for abdominal cramps    2. Have you seen or consulted any other health care providers outside of the [address] [place] [address] since your last visit? Include any pap smears or colon screening.  Yes Reason for visit: same

## 2019-01-24 NOTE — PATIENT INSTRUCTIONS

## 2019-01-24 NOTE — H&P
Surgery History and Physical 
 
Subjective: Vic Lyle is a 34 y.o. male who began having RLQ abdominal pain about 10 days ago. After 2 days of pain [facility] told him to drink kambucha and avoid dairy. He felt slightly better but the pain did not go away. Over the past few days it got worse and last night he began having fevers. He has no Nausea or vomiting. No Diarrhea. He has never had anything similar in the past. As he was having fevers he was sent by PCP for workup . Patient Active Problem List  
 Diagnosis Date Noted  Appendicitis with abscess 01/24/2019  Sinus pressure 12/11/2017 History reviewed. No pertinent past medical history. Past Surgical History:  
Procedure Laterality Date  HX ACL RECONSTRUCTION Social History Tobacco Use  Smoking status: Never Smoker  Smokeless tobacco: Never Used Substance Use Topics  Alcohol use: Yes Alcohol/week: 3.6 oz Types: 4 Cans of beer, 2 Shots of liquor per week Frequency: 4 or more times a week Drinks per session: 1 or 2 Comment: 1-2 drinks nightly Family History Problem Relation Age of Onset  No Known Problems Mother  No Known Problems Father  No Known Problems Brother  Dementia Maternal Grandmother  No Known Problems Maternal Grandfather  No Known Problems Paternal Grandmother  No Known Problems Paternal Grandfather  Other Paternal Uncle   
     testicular cancer Prior to Admission medications Medication Sig Start Date End Date Taking? Authorizing Provider  
multivitamin with iron (DAILY MULTI-VITAMINS/IRON) tablet Take 1 Tab by mouth daily. Yes Provider, Historical  
calcium-magnesium-zinc 333-133-5 mg tab Take  by mouth. Yes Provider, Historical  
cholecalciferol (VITAMIN D3) 1,000 unit cap Take  by mouth daily. Yes Provider, Historical  
 
Allergies Allergen Reactions  Soy Other (comments) Review of Systems: Pertinent items are noted in the History of Present Illness. Objective:  
 
Visit Vitals /72 Pulse 93 Temp 98.7 °F (37.1 °C) Resp 18 SpO2 100% Physical Exam:   
GENERAL: alert, cooperative, no distress, appears stated age, EYE: negative, THROAT & NECK: normal, LUNG: clear to auscultation bilaterally, HEART: regular rate and rhythm, ABDOMEN: soft with mild RLQ tenderness no rebound, EXTREMITIES:  no edema, SKIN: Normal., NEUROLOGIC: negative, PSYCH: non focal 
 
Imaging:  images and reports reviewed CT- The appendix is enlarged measuring 14 mm with a thick enhancing 
wall. There is inflammatory stranding around the pancreas and thickening of the 
base of the cecum and the adjacent terminal ileum with a small complex 
collection measuring 3 cm in diameter near the tip of the cecum but not 
accessible to percutaneous drainage. Lab Review:   
Recent Results (from the past 24 hour(s)) AMB POC URINALYSIS DIP STICK AUTO W/O MICRO Collection Time: 01/24/19 10:50 AM  
Result Value Ref Range Color (UA POC) CIT Group Clarity (UA POC) Clear Glucose (UA POC) Negative Negative Bilirubin (UA POC) Negative Negative Ketones (UA POC) Negative Negative Specific gravity (UA POC) 1.015 1.001 - 1.035 Blood (UA POC) Negative Negative pH (UA POC) 8.5 (A) 4.6 - 8.0 Protein (UA POC) Negative Negative Urobilinogen (UA POC) 1 mg/dL 0.2 - 1 Nitrites (UA POC) Negative Negative Leukocyte esterase (UA POC) Negative Negative SAMPLES BEING HELD Collection Time: 01/24/19  5:20 PM  
Result Value Ref Range SAMPLES BEING HELD GREEN   
 COMMENT Add-on orders for these samples will be processed based on acceptable specimen integrity and analyte stability, which may vary by analyte. POC CHEM8 Collection Time: 01/24/19  5:27 PM  
Result Value Ref Range Calcium, ionized (POC) 1.07 (L) 1.12 - 1.32 mmol/L  Sodium (POC) 133 (L) 136 - 145 mmol/L  
 Potassium (POC) 7.3 (HH) 3.5 - 5.1 mmol/L Chloride (POC) 100 98 - 107 mmol/L  
 CO2 (POC) 26 21 - 32 mmol/L Anion gap (POC) 15 10 - 20 mmol/L Glucose (POC) 84 65 - 100 mg/dL BUN (POC) 17 9 - 20 mg/dL Creatinine (POC) 0.9 0.6 - 1.3 mg/dL GFRAA, POC >60 >60 ml/min/1.73m2 GFRNA, POC >60 >60 ml/min/1.73m2 Hematocrit (POC) 43 36.6 - 50.3 % Comment Comment Not Indicated. CBC WITH AUTOMATED DIFF Collection Time: 01/24/19  5:57 PM  
Result Value Ref Range WBC 11.4 (H) 4.1 - 11.1 K/uL  
 RBC 4.73 4.10 - 5.70 M/uL  
 HGB 15.1 12.1 - 17.0 g/dL HCT 42.2 36.6 - 50.3 % MCV 89.2 80.0 - 99.0 FL  
 MCH 31.9 26.0 - 34.0 PG  
 MCHC 35.8 30.0 - 36.5 g/dL  
 RDW 11.0 (L) 11.5 - 14.5 % PLATELET 108 225 - 258 K/uL MPV 10.3 8.9 - 12.9 FL  
 NRBC 0.0 0  WBC ABSOLUTE NRBC 0.00 0.00 - 0.01 K/uL NEUTROPHILS 79 (H) 32 - 75 % LYMPHOCYTES 11 (L) 12 - 49 % MONOCYTES 9 5 - 13 % EOSINOPHILS 0 0 - 7 % BASOPHILS 0 0 - 1 % IMMATURE GRANULOCYTES 0 0.0 - 0.5 % ABS. NEUTROPHILS 9.0 (H) 1.8 - 8.0 K/UL  
 ABS. LYMPHOCYTES 1.2 0.8 - 3.5 K/UL  
 ABS. MONOCYTES 1.0 0.0 - 1.0 K/UL  
 ABS. EOSINOPHILS 0.0 0.0 - 0.4 K/UL  
 ABS. BASOPHILS 0.0 0.0 - 0.1 K/UL  
 ABS. IMM. GRANS. 0.0 0.00 - 0.04 K/UL  
 DF AUTOMATED    
SAMPLES BEING HELD Collection Time: 01/24/19  5:57 PM  
Result Value Ref Range SAMPLES BEING HELD 1PST COMMENT Add-on orders for these samples will be processed based on acceptable specimen integrity and analyte stability, which may vary by analyte. METABOLIC PANEL, BASIC Collection Time: 01/24/19  6:13 PM  
Result Value Ref Range Sodium 136 136 - 145 mmol/L Potassium 3.6 3.5 - 5.1 mmol/L Chloride 102 97 - 108 mmol/L  
 CO2 23 21 - 32 mmol/L Anion gap 11 5 - 15 mmol/L Glucose 86 65 - 100 mg/dL BUN 14 6 - 20 MG/DL Creatinine 1.04 0.70 - 1.30 MG/DL  
 BUN/Creatinine ratio 13 12 - 20 GFR est AA >60 >60 ml/min/1.73m2 GFR est non-AA >60 >60 ml/min/1.73m2 Calcium 9.6 8.5 - 10.1 MG/DL Assessment:Plan Phlegmenous Appendicitis Because of the phlegmon will be unable to perform Appendectomy at this time due to the risk of complication Will admit for IV abx May need repeat CT scan in a few days Eventually will need Lap Appendectomy Signed By: Daniel Harvey MD   
 January 24, 2019

## 2019-01-24 NOTE — PROGRESS NOTES
Admission Medication Reconciliation: 
 
Information obtained from: patient Significant PMH/Disease States:  
History reviewed. No pertinent past medical history. Chief Complaint for this Admission:  abdominal pain Allergies:  Soy Prior to Admission Medications:  
Prior to Admission Medications Prescriptions Last Dose Informant Patient Reported? Taking?  
calcium-magnesium-zinc 333-133-5 mg tab   Yes Yes Sig: Take  by mouth. cholecalciferol (VITAMIN D3) 1,000 unit cap   Yes Yes Sig: Take  by mouth daily. multivitamin with iron (DAILY MULTI-VITAMINS/IRON) tablet   Yes Yes Sig: Take 1 Tab by mouth daily. Facility-Administered Medications: None Comments/Recommendations: Medication review done with patient. No regular medications. He takes occasional nutritional supplements. No known med allergies.

## 2019-01-24 NOTE — PROGRESS NOTES
[address] [facility] Note  HPI:   Tracy Limon is a 34 y.o. male who presents to [facility] care. Chief Complaint   Patient presents with    New Patient    Abdominal Pain     pt states he has been having abdominal pain and fevers and back pains for over a week. Travels often for work, may be related. Tracy Limon is an 34 y.o. male who presents for evaluation of abdominal pain. The pain began suddenly. Onset was 10 days ago. Improved somewhat after following bland diet for a few days. Symptoms returned 3 days ago have been unchanged since. Pain is described as a moderate to severe, constant. Described as aching but can be sharp at times. Pain is located in the general abdomen, mostly bilateraly lower abdomen. Radiation of pain into the groin and testicles, as well as genearlized low back aching. Aggravating factors: eating, especially fatty foods/dairy foods. Unrelated to activity. Alleviating factors: none. Associated symptoms: intermittent fever (103 F last night), a little bloated, generalized malaise, looser stools. Feels hungry but cannot tolerate large meal. No acid reflux, significant nausea, no vominit. Looser stools but no diarrhea. No hematochezia, a little darker stool but has been taking Pepto bismol. 5 pound weight loss since onset. Patient denies any associated reflux. .  Patient denies heavy ETOH, NSAIDs, or recent change in medications. He does drink 2 drinks nightly. Recently , recent travel to [place] a few weeks ago for his wedding. [place] [employer] [place].  for work many months ago. Family history of testicular cancer and he is worried this could be a cause of his pain. Current Outpatient Medications   Medication Sig Dispense Refill    multivitamin with iron (DAILY MULTI-VITAMINS/IRON) tablet Take 1 Tab by mouth daily.  calcium-magnesium-zinc 333-133-5 mg tab Take  by mouth.       cholecalciferol (VITAMIN D3) 1,000 unit cap Take  by mouth daily. No Known Allergies   Patient Active Problem List   Diagnosis Code    Sinus pressure J34.89     History reviewed. No pertinent past medical history. Past Surgical History:   Procedure Laterality Date    HX ACL RECONSTRUCTION        No LMP for male patient. Family History   Problem Relation Age of Onset    No Known Problems Mother     No Known Problems Father     No Known Problems Brother     Dementia Maternal Grandmother     No Known Problems Maternal Grandfather     No Known Problems Paternal Grandmother     No Known Problems Paternal Grandfather     Other Paternal Uncle         testicular cancer      Social History     Socioeconomic History    Marital status: SINGLE     Spouse name: Not on file    Number of children: Not on file    Years of education: Not on file    Highest education level: Not on file   Social Needs    Financial resource strain: Not on file    Food insecurity - worry: Not on file    Food insecurity - inability: Not on file   [employer] needs - medical: Not on file   [employer] needs - non-medical: Not on file   Occupational History    Not on file   Tobacco Use    Smoking status: Never Smoker    Smokeless tobacco: Never Used   Substance and Sexual Activity    Alcohol use: Yes     Alcohol/week: 3.6 oz     Types: 4 Cans of beer, 2 Shots of liquor per week     Frequency: 4 or more times a week     Drinks per session: 1 or 2     Comment: 1-2 drinks nightly    Drug use: No    Sexual activity: Yes     Partners: Female     Birth control/protection: None   Other Topics Concern    Not on file   Social History Narrative    Not on file        ROS:   Review of Systems   Constitutional: Positive for fever, malaise/fatigue and weight loss. Negative for chills and diaphoresis. HENT: Negative for congestion, ear pain, hearing loss, sinus pain, sore throat and tinnitus. Eyes: Negative for blurred vision and double vision.    Respiratory: Negative for shortness of breath. Cardiovascular: Negative for chest pain, palpitations and leg swelling. Gastrointestinal: Positive for abdominal pain, melena and nausea. Negative for blood in stool, constipation, diarrhea, heartburn and vomiting. Genitourinary: Negative for dysuria, frequency, hematuria and urgency. Musculoskeletal: Negative for joint pain and myalgias. Skin: Negative for itching and rash. Neurological: Negative for dizziness, tingling, weakness and headaches. Endo/Heme/Allergies: Negative for polydipsia. Psychiatric/Behavioral: Negative. Physical Exam:     Visit Vitals  /80 (BP 1 Location: Right arm, BP Patient Position: Sitting)   Pulse 75   Temp 98.7 °F (37.1 °C) (Oral)   Resp 16   Ht 6' 2\" (1.88 m)   Wt 205 lb (93 kg)   SpO2 99%   BMI 26.32 kg/m²        Vitals and Nurse Documentation reviewed. Physical Exam   Constitutional: He is oriented to person, place, and time and well-developed, well-nourished, and in no distress. Vital signs are normal.   HENT:   Head: Normocephalic and atraumatic. Right Ear: Hearing, tympanic membrane, external ear and ear canal normal. Tympanic membrane is not erythematous. No middle ear effusion. Left Ear: Hearing, external ear and ear canal normal. Tympanic membrane is not erythematous. No middle ear effusion. Nose: Nose normal. No nasal deformity or septal deviation. Mouth/Throat: Uvula is midline and oropharynx is clear and moist. Mucous membranes are not pale, not dry and not cyanotic. Normal dentition. No dental caries. No oropharyngeal exudate. Eyes: Conjunctivae and lids are normal. Pupils are equal, round, and reactive to light. Right conjunctiva is not injected. Left conjunctiva is not injected. Neck: Normal range of motion and phonation normal. Neck supple. No thyroid mass present. Cardiovascular: Normal rate, regular rhythm, S1 normal, S2 normal, normal heart sounds and intact distal pulses.  Exam reveals no gallop and no friction rub. No murmur heard. Pulmonary/Chest: Effort normal and breath sounds normal. No accessory muscle usage. No respiratory distress. He has no decreased breath sounds. He has no wheezes. He has no rhonchi. He has no rales. He exhibits no tenderness. Abdominal: Soft. Normal appearance and bowel sounds are normal. He exhibits no distension and no mass. There is no hepatosplenomegaly. There is tenderness in the right upper quadrant and right lower quadrant. There is no rigidity, no rebound, no guarding, no tenderness at McBurney's point and negative Alvarez's sign. Significant tenderness of bilateral lower abdomen right greater than left. Genitourinary: Penis normal. He exhibits no testicular tenderness, no abnormal scrotal mass and no scrotal tenderness. Genitourinary Comments: Small nodule approximately 3 mm x 3 mm noted on top of right testicle, non-tender, soft, mobile. Musculoskeletal: Normal range of motion. He exhibits no edema, tenderness or deformity. Generalized mild tenderness to palpation of bilateral lower back. Lymphadenopathy:        Head (right side): No submental, no submandibular, no tonsillar, no preauricular, no posterior auricular and no occipital adenopathy present. Head (left side): No submental, no submandibular, no tonsillar, no preauricular, no posterior auricular and no occipital adenopathy present. He has no cervical adenopathy. Right: No inguinal and no supraclavicular adenopathy present. Left: No inguinal and no supraclavicular adenopathy present. Neurological: He is alert and oriented to person, place, and time. He has normal strength. Gait normal.   Skin: Skin is warm, dry and intact. No rash noted. He is not diaphoretic. No cyanosis. Nails show no clubbing. Psychiatric: Mood and affect normal.   Nursing note and vitals reviewed.         Assessment/ Plan:   [employer] were discussed including hours of operation, on-call providers, and [facility] Diagnoses and all orders for this visit:    1. Acute bilateral lower abdominal pain: Acute onset bilateral lower abdominal pain right greater than left with radiation to groin and low back, with associated fevers, malaise, weight loss. Stat CT of abdomen today to rule out appendicitis. Other differentials include colitis, possible pancreatitis with frequent ETOH use. Will obtain labs for further evaluation. Treatment based on results of imaging/labs. Discussed red flags that would prompt urgent medical evaluation. Close follow-up in 1 week or sooner if needed. -     AMB POC URINALYSIS DIP STICK AUTO W/O MICRO  -     CBC WITH AUTOMATED DIFF  -     METABOLIC PANEL, COMPREHENSIVE  -     LIPASE  -     CT ABD PELV W CONT; Future    2. Abnormal testicular exam: Incidental finding of small nodule of right testicle, likely unrelated to current illness. He is agreeable to referral to urology for further evaluation.   -     REFERRAL TO UROLOGY      Follow-up Disposition:  Return in about 1 week (around 1/31/2019), or if symptoms worsen or fail to improve.      Discussed expected course/resolution/complications of diagnosis in detail with patient.    Medication risks/benefits/costs/interactions/alternatives discussed with patient.    Pt was given an after visit summary which includes diagnoses, current medications & vitals.  Pt expressed understanding with the diagnosis and plan

## 2019-01-24 NOTE — ED PROVIDER NOTES
35 yo male presents to ER for evaluation of abdominal pain. Pt began with abdominal pain Tuesday through Thursday. Then felt better until Monday. Since then patient reports increasing pain. Pt has had fever last night. Pain in lower abdomen into back. No nausea or vomiting. No diarrhea. No urinary symptoms. Pt has tried ibuprofen last night, mild relief. No medicines today. No cp, sob, dizziness. Social hx 
 
nonsmoker The history is provided by the patient. No past medical history on file. Past Surgical History:  
Procedure Laterality Date  HX ACL RECONSTRUCTION Family History:  
Problem Relation Age of Onset  No Known Problems Mother  No Known Problems Father  No Known Problems Brother  Dementia Maternal Grandmother  No Known Problems Maternal Grandfather  No Known Problems Paternal Grandmother  No Known Problems Paternal Grandfather  Other Paternal Uncle   
     testicular cancer Social History Socioeconomic History  Marital status:  Spouse name: Not on file  Number of children: Not on file  Years of education: Not on file  Highest education level: Not on file Social Needs  Financial resource strain: Not on file  Food insecurity - worry: Not on file  Food insecurity - inability: Not on file  Transportation needs - medical: Not on file  Transportation needs - non-medical: Not on file Occupational History  Not on file Tobacco Use  Smoking status: Never Smoker  Smokeless tobacco: Never Used Substance and Sexual Activity  Alcohol use: Yes Alcohol/week: 3.6 oz Types: 4 Cans of beer, 2 Shots of liquor per week Frequency: 4 or more times a week Drinks per session: 1 or 2 Comment: 1-2 drinks nightly  Drug use: No  
 Sexual activity: Yes  
  Partners: Female Birth control/protection: None Other Topics Concern  Not on file Social History Narrative  Not on file ALLERGIES: Patient has no known allergies. Review of Systems There were no vitals filed for this visit. Physical Exam  
Constitutional: He is oriented to person, place, and time. He appears well-developed and well-nourished. No distress. HENT:  
Head: Normocephalic and atraumatic. Right Ear: External ear normal.  
Left Ear: External ear normal.  
Eyes: Conjunctivae and EOM are normal. Pupils are equal, round, and reactive to light. Neck: Normal range of motion. Neck supple. Cardiovascular: Normal rate, regular rhythm and normal heart sounds. Pulmonary/Chest: Effort normal and breath sounds normal. No respiratory distress. He has no wheezes. Abdominal: Soft. Normal appearance and bowel sounds are normal. He exhibits no shifting dullness, no distension, no pulsatile liver, no fluid wave, no abdominal bruit, no ascites, no pulsatile midline mass and no mass. There is no hepatosplenomegaly, splenomegaly or hepatomegaly. There is tenderness. There is no rigidity, no rebound, no guarding, no CVA tenderness, no tenderness at McBurney's point and negative Alvarez's sign. Abdomen exposed for exam. 
Pt tender lower abdomen No peritoneal signs Musculoskeletal: Normal range of motion. He exhibits no edema or tenderness. Neurological: He is alert and oriented to person, place, and time. He has normal reflexes. He displays normal reflexes. No cranial nerve deficit. Coordination normal.  
Skin: Skin is warm and dry. No rash noted. No erythema. Psychiatric: He has a normal mood and affect. His behavior is normal. Judgment and thought content normal.  
Nursing note and vitals reviewed. MDM Number of Diagnoses or Management Options Appendicitis, unspecified appendicitis type:  
Diagnosis management comments: 35 yo male with appendicitis on CT. No distress. No peritoneal signs P: labs, general surgery consult.  
 
5:27 PM 
 Pt case including HPI, PE, and all available lab and radiology results has been discussed with Dr. Eduard Domingo. He will see patient in ER Procedures

## 2019-01-25 PROBLEM — K35.32 PERFORATED APPENDICITIS: Status: ACTIVE | Noted: 2019-01-25

## 2019-01-25 LAB
ALBUMIN SERPL-MCNC: 4.8 G/DL (ref 3.5–5.5)
ALBUMIN/GLOB SERPL: 1.7 {RATIO} (ref 1.2–2.2)
ALP SERPL-CCNC: 75 IU/L (ref 39–117)
ALT SERPL-CCNC: 22 IU/L (ref 0–44)
ANION GAP SERPL CALC-SCNC: 9 MMOL/L (ref 5–15)
AST SERPL-CCNC: 13 IU/L (ref 0–40)
BASOPHILS # BLD AUTO: 0 X10E3/UL (ref 0–0.2)
BASOPHILS # BLD: 0 K/UL (ref 0–0.1)
BASOPHILS NFR BLD AUTO: 0 %
BASOPHILS NFR BLD: 0 % (ref 0–1)
BILIRUB SERPL-MCNC: 1.3 MG/DL (ref 0–1.2)
BUN SERPL-MCNC: 12 MG/DL (ref 6–20)
BUN SERPL-MCNC: 13 MG/DL (ref 6–20)
BUN/CREAT SERPL: 11 (ref 12–20)
BUN/CREAT SERPL: 13 (ref 9–20)
CALCIUM SERPL-MCNC: 10.3 MG/DL (ref 8.7–10.2)
CALCIUM SERPL-MCNC: 9 MG/DL (ref 8.5–10.1)
CHLORIDE SERPL-SCNC: 100 MMOL/L (ref 96–106)
CHLORIDE SERPL-SCNC: 104 MMOL/L (ref 97–108)
CO2 SERPL-SCNC: 23 MMOL/L (ref 20–29)
CO2 SERPL-SCNC: 24 MMOL/L (ref 21–32)
CREAT SERPL-MCNC: 1.03 MG/DL (ref 0.76–1.27)
CREAT SERPL-MCNC: 1.12 MG/DL (ref 0.7–1.3)
DIFFERENTIAL METHOD BLD: ABNORMAL
EOSINOPHIL # BLD AUTO: 0.1 X10E3/UL (ref 0–0.4)
EOSINOPHIL # BLD: 0.1 K/UL (ref 0–0.4)
EOSINOPHIL NFR BLD AUTO: 1 %
EOSINOPHIL NFR BLD: 1 % (ref 0–7)
ERYTHROCYTE [DISTWIDTH] IN BLOOD BY AUTOMATED COUNT: 11.2 % (ref 11.5–14.5)
ERYTHROCYTE [DISTWIDTH] IN BLOOD BY AUTOMATED COUNT: 12.3 % (ref 12.3–15.4)
GLOBULIN SER CALC-MCNC: 2.8 G/DL (ref 1.5–4.5)
GLUCOSE SERPL-MCNC: 88 MG/DL (ref 65–100)
GLUCOSE SERPL-MCNC: 88 MG/DL (ref 65–99)
HCT VFR BLD AUTO: 42.1 % (ref 36.6–50.3)
HCT VFR BLD AUTO: 44.7 % (ref 37.5–51)
HGB BLD-MCNC: 14.5 G/DL (ref 12.1–17)
HGB BLD-MCNC: 15.8 G/DL (ref 13–17.7)
IMM GRANULOCYTES # BLD AUTO: 0 K/UL (ref 0–0.04)
IMM GRANULOCYTES # BLD AUTO: 0 X10E3/UL (ref 0–0.1)
IMM GRANULOCYTES NFR BLD AUTO: 0 %
IMM GRANULOCYTES NFR BLD AUTO: 0 % (ref 0–0.5)
LIPASE SERPL-CCNC: 18 U/L (ref 13–78)
LYMPHOCYTES # BLD AUTO: 1.2 X10E3/UL (ref 0.7–3.1)
LYMPHOCYTES # BLD: 1.8 K/UL (ref 0.8–3.5)
LYMPHOCYTES NFR BLD AUTO: 12 %
LYMPHOCYTES NFR BLD: 17 % (ref 12–49)
MCH RBC QN AUTO: 31 PG (ref 26–34)
MCH RBC QN AUTO: 32 PG (ref 26.6–33)
MCHC RBC AUTO-ENTMCNC: 34.4 G/DL (ref 30–36.5)
MCHC RBC AUTO-ENTMCNC: 35.3 G/DL (ref 31.5–35.7)
MCV RBC AUTO: 90.1 FL (ref 80–99)
MCV RBC AUTO: 91 FL (ref 79–97)
MONOCYTES # BLD AUTO: 1.3 X10E3/UL (ref 0.1–0.9)
MONOCYTES # BLD: 1.2 K/UL (ref 0–1)
MONOCYTES NFR BLD AUTO: 12 %
MONOCYTES NFR BLD: 12 % (ref 5–13)
NEUTROPHILS # BLD AUTO: 7.7 X10E3/UL (ref 1.4–7)
NEUTROPHILS NFR BLD AUTO: 75 %
NEUTS SEG # BLD: 7.3 K/UL (ref 1.8–8)
NEUTS SEG NFR BLD: 69 % (ref 32–75)
NRBC # BLD: 0 K/UL (ref 0–0.01)
NRBC BLD-RTO: 0 PER 100 WBC
PLATELET # BLD AUTO: 162 K/UL (ref 150–400)
PLATELET # BLD AUTO: 200 X10E3/UL (ref 150–379)
PMV BLD AUTO: 10.3 FL (ref 8.9–12.9)
POTASSIUM SERPL-SCNC: 4 MMOL/L (ref 3.5–5.1)
POTASSIUM SERPL-SCNC: 4.3 MMOL/L (ref 3.5–5.2)
PROT SERPL-MCNC: 7.6 G/DL (ref 6–8.5)
RBC # BLD AUTO: 4.67 M/UL (ref 4.1–5.7)
RBC # BLD AUTO: 4.93 X10E6/UL (ref 4.14–5.8)
SODIUM SERPL-SCNC: 137 MMOL/L (ref 136–145)
SODIUM SERPL-SCNC: 141 MMOL/L (ref 134–144)
WBC # BLD AUTO: 10.3 X10E3/UL (ref 3.4–10.8)
WBC # BLD AUTO: 10.5 K/UL (ref 4.1–11.1)

## 2019-01-25 PROCEDURE — 74011250637 HC RX REV CODE- 250/637: Performed by: SURGERY

## 2019-01-25 PROCEDURE — 36415 COLL VENOUS BLD VENIPUNCTURE: CPT

## 2019-01-25 PROCEDURE — 99218 HC RM OBSERVATION: CPT

## 2019-01-25 PROCEDURE — 80048 BASIC METABOLIC PNL TOTAL CA: CPT

## 2019-01-25 PROCEDURE — 65270000029 HC RM PRIVATE

## 2019-01-25 PROCEDURE — 74011000258 HC RX REV CODE- 258: Performed by: SURGERY

## 2019-01-25 PROCEDURE — 74011250636 HC RX REV CODE- 250/636: Performed by: SURGERY

## 2019-01-25 PROCEDURE — 85025 COMPLETE CBC W/AUTO DIFF WBC: CPT

## 2019-01-25 RX ADMIN — SODIUM CHLORIDE, SODIUM LACTATE, POTASSIUM CHLORIDE, AND CALCIUM CHLORIDE 100 ML/HR: 600; 310; 30; 20 INJECTION, SOLUTION INTRAVENOUS at 13:53

## 2019-01-25 RX ADMIN — Medication 10 ML: at 13:52

## 2019-01-25 RX ADMIN — DIPHENHYDRAMINE HYDROCHLORIDE 25 MG: 25 CAPSULE ORAL at 00:13

## 2019-01-25 RX ADMIN — PIPERACILLIN SODIUM,TAZOBACTAM SODIUM 3.38 G: 3; .375 INJECTION, POWDER, FOR SOLUTION INTRAVENOUS at 05:00

## 2019-01-25 RX ADMIN — OXYCODONE AND ACETAMINOPHEN 1 TABLET: 5; 325 TABLET ORAL at 05:56

## 2019-01-25 RX ADMIN — PIPERACILLIN SODIUM,TAZOBACTAM SODIUM 3.38 G: 3; .375 INJECTION, POWDER, FOR SOLUTION INTRAVENOUS at 18:41

## 2019-01-25 RX ADMIN — ENOXAPARIN SODIUM 40 MG: 40 INJECTION SUBCUTANEOUS at 15:37

## 2019-01-25 RX ADMIN — OXYCODONE AND ACETAMINOPHEN 1 TABLET: 5; 325 TABLET ORAL at 18:40

## 2019-01-25 RX ADMIN — OXYCODONE AND ACETAMINOPHEN 1 TABLET: 5; 325 TABLET ORAL at 01:42

## 2019-01-25 RX ADMIN — OXYCODONE AND ACETAMINOPHEN 1 TABLET: 5; 325 TABLET ORAL at 11:29

## 2019-01-25 RX ADMIN — PIPERACILLIN SODIUM,TAZOBACTAM SODIUM 3.38 G: 3; .375 INJECTION, POWDER, FOR SOLUTION INTRAVENOUS at 11:30

## 2019-01-25 NOTE — PROGRESS NOTES
· Surgery Progress Note 1/25/2019 Admit Date: 1/24/2019 Subjective:  
 
  Pt has complaint of some notable lower abdominal pain after percocet wears off but other wise feels ok, is hungry . Pts pain present - adequately treated. No SOB. No CP. Pt is ambulating. Patient 's current diet is Clear Liquid. . Pt reports  no nausea and no vomiting. Pt reports no fever or chills Bowel Movements: a bit loose, yesterday Objective:  
 
Blood pressure 121/68, pulse 67, temperature 98.3 °F (36.8 °C), resp. rate 18, SpO2 97 %. No intake/output data recorded. 01/23 1901 - 01/25 0700 In: 288.3 [I.V.:288.3] Out: 1400 [[facility]:[id number]] General appearance: alert, cooperative, no distress, appears stated age Lungs: clear to auscultation bilaterally Heart: regular rate and rhythm Abd:soft, protuberant, nondistended, tenderness mild - in the periumbilical area, without guarding, without rebound Extremities: no edema Neurologic: Grossly normal 
 
Data Review Recent Results (from the past 12 hour(s)) CBC WITH AUTOMATED DIFF Collection Time: 01/25/19  5:30 AM  
Result Value Ref Range WBC 10.5 4.1 - 11.1 K/uL  
 RBC 4.67 4.10 - 5.70 M/uL  
 HGB 14.5 12.1 - 17.0 g/dL HCT 42.1 36.6 - 50.3 % MCV 90.1 80.0 - 99.0 FL  
 MCH 31.0 26.0 - 34.0 PG  
 MCHC 34.4 30.0 - 36.5 g/dL  
 RDW 11.2 (L) 11.5 - 14.5 % PLATELET 685 345 - 664 K/uL MPV 10.3 8.9 - 12.9 FL  
 NRBC 0.0 0  WBC ABSOLUTE NRBC 0.00 0.00 - 0.01 K/uL NEUTROPHILS 69 32 - 75 % LYMPHOCYTES 17 12 - 49 % MONOCYTES 12 5 - 13 % EOSINOPHILS 1 0 - 7 % BASOPHILS 0 0 - 1 % IMMATURE GRANULOCYTES 0 0.0 - 0.5 % ABS. NEUTROPHILS 7.3 1.8 - 8.0 K/UL  
 ABS. LYMPHOCYTES 1.8 0.8 - 3.5 K/UL  
 ABS. MONOCYTES 1.2 (H) 0.0 - 1.0 K/UL  
 ABS. EOSINOPHILS 0.1 0.0 - 0.4 K/UL  
 ABS. BASOPHILS 0.0 0.0 - 0.1 K/UL  
 ABS. IMM. GRANS. 0.0 0.00 - 0.04 K/UL  
 DF AUTOMATED METABOLIC PANEL, BASIC  
 Collection Time: 01/25/19  5:30 AM  
Result Value Ref Range Sodium 137 136 - 145 mmol/L Potassium 4.0 3.5 - 5.1 mmol/L Chloride 104 97 - 108 mmol/L  
 CO2 24 21 - 32 mmol/L Anion gap 9 5 - 15 mmol/L Glucose 88 65 - 100 mg/dL BUN 12 6 - 20 MG/DL Creatinine 1.12 0.70 - 1.30 MG/DL  
 BUN/Creatinine ratio 11 (L) 12 - 20 GFR est AA >60 >60 ml/min/1.73m2 GFR est non-AA >60 >60 ml/min/1.73m2 Calcium 9.0 8.5 - 10.1 MG/DL Assessment:  
 
Active Problems: 
  Appendicitis with abscess (1/24/2019) Plan:  
Dietadvance as tolerated Pain management GI Prophylaxis OOB Antibiotics:  Zosyn--he has received 2 doses thus far WBC WNL and afebrile but still with some pain, ok to advance diet, continue IV abx,  Hopefully home over the weekend on oral antibiotics Further plan per Dr. Alejandro Valiente PA-C I have independently examined the patient and have reviewed the chart. I agree with the above plan. Doing well, repeat a CT on Sunday, continue diet.    
 
Alok Castellon MD

## 2019-01-25 NOTE — PROGRESS NOTES
Bedside shift change report given to Diego Britt [id number] (oncoming nurse) by Marely Pinto (offgoing nurse). Report included the following information SBAR.

## 2019-01-25 NOTE — PROGRESS NOTES
Acute appendicitis. Patient was referred by radiologist to the ER 1/24/2019 for further eval and treatment.

## 2019-01-25 NOTE — ROUTINE PROCESS
TRANSFER - OUT REPORT: 
 
Verbal report given to Daniel Wall (name) on Ed Granda  being transferred to [address] [facility] (unit) for routine progression of care Report consisted of patients Situation, Background, Assessment and  
Recommendations(SBAR). Information from the following report(s) SBAR, ED Summary, [facility] and Recent Results was reviewed with the receiving nurse. Lines:  
Peripheral IV 01/24/19 Left Antecubital (Active) Site Assessment Clean, dry, & intact 1/24/2019  5:02 PM  
Phlebitis Assessment 0 1/24/2019  5:02 PM  
Infiltration Assessment 0 1/24/2019  5:02 PM  
Dressing Status Clean, dry, & intact 1/24/2019  5:02 PM  
Hub Color/Line Status Pink 1/24/2019  5:02 PM  
  
 
Opportunity for questions and clarification was provided. Patient transported with: 
 [employer]

## 2019-01-25 NOTE — PHYSICIAN ADVISORY
Letter of Status Determination:  
Recommend hospitalization status upgraded from OBSERVATION  to INPATIENT  Status Pt Name:  Naye Marquez  
MR#  
[facility] # [id number] / 
[id number] Payor: Kelly Woods / Plan: [address] [employer] HMO / Product Type: HMO /   
CSN#  [id number] Room and Hospital  502/02  @ [facility]  
Hospitalization date  1/24/2019  5:15 PM  
Current Attending Physician  Aaron Paz MD  
Principal diagnosis  Appendicitis with abscess Clinicals  34 y.o. y.o  male hospitalized with above diagnosis This pt is noted to have presented with phlegmonous acute appendicitis. Conservative approach to treat the infection with IV abx is taken. It is noted that the pt is in significant pain that is being controlled adequately with current meds. [place] (MCG) criteria Does   apply Acute appendicitis STATUS DETERMINATION  Based on documented presenting clinical data, comorbid conditions, high risk of adverse events and deterioration, it is our recommendation that the patient's status should be upgraded from OBSERVATION to INPATIENT status. The final decision of the patient's hospitalization status depends on the attending physician's judgment. Additional comments Payor: Kelly Woods / Plan: [employer] HMO / Product Type: HMO /   
  
 
Nury Gordon MD MPH FACP Cell: [phone] Physician Advisor [facility] [address] [facility]  
President Medical Staff, [address] [facility]   
Cell  [phone]   
 
 
[phone] Shruthi Andersen

## 2019-01-26 PROCEDURE — 74011250636 HC RX REV CODE- 250/636: Performed by: SURGERY

## 2019-01-26 PROCEDURE — 36415 COLL VENOUS BLD VENIPUNCTURE: CPT

## 2019-01-26 PROCEDURE — 74011250637 HC RX REV CODE- 250/637: Performed by: SURGERY

## 2019-01-26 PROCEDURE — 85025 COMPLETE CBC W/AUTO DIFF WBC: CPT

## 2019-01-26 PROCEDURE — 65270000029 HC RM PRIVATE

## 2019-01-26 PROCEDURE — 74011000258 HC RX REV CODE- 258: Performed by: SURGERY

## 2019-01-26 RX ORDER — POLYVINYL ALCOHOL 14 MG/ML
1 SOLUTION/ DROPS OPHTHALMIC AS NEEDED
Status: DISCONTINUED | OUTPATIENT
Start: 2019-01-26 | End: 2019-01-27 | Stop reason: HOSPADM

## 2019-01-26 RX ADMIN — SODIUM CHLORIDE, SODIUM LACTATE, POTASSIUM CHLORIDE, AND CALCIUM CHLORIDE 100 ML/HR: 600; 310; 30; 20 INJECTION, SOLUTION INTRAVENOUS at 09:21

## 2019-01-26 RX ADMIN — OXYCODONE AND ACETAMINOPHEN 1 TABLET: 5; 325 TABLET ORAL at 11:56

## 2019-01-26 RX ADMIN — PIPERACILLIN SODIUM,TAZOBACTAM SODIUM 3.38 G: 3; .375 INJECTION, POWDER, FOR SOLUTION INTRAVENOUS at 03:37

## 2019-01-26 RX ADMIN — PIPERACILLIN SODIUM,TAZOBACTAM SODIUM 3.38 G: 3; .375 INJECTION, POWDER, FOR SOLUTION INTRAVENOUS at 19:06

## 2019-01-26 RX ADMIN — OXYCODONE AND ACETAMINOPHEN 1 TABLET: 5; 325 TABLET ORAL at 00:27

## 2019-01-26 RX ADMIN — OXYCODONE AND ACETAMINOPHEN 1 TABLET: 5; 325 TABLET ORAL at 17:38

## 2019-01-26 RX ADMIN — ENOXAPARIN SODIUM 40 MG: 40 INJECTION SUBCUTANEOUS at 15:37

## 2019-01-26 RX ADMIN — PIPERACILLIN SODIUM,TAZOBACTAM SODIUM 3.38 G: 3; .375 INJECTION, POWDER, FOR SOLUTION INTRAVENOUS at 11:56

## 2019-01-26 RX ADMIN — POLYVINYL ALCOHOL 1 DROP: 14 SOLUTION/ DROPS OPHTHALMIC at 15:35

## 2019-01-26 RX ADMIN — Medication 10 ML: at 14:33

## 2019-01-26 RX ADMIN — OXYCODONE AND ACETAMINOPHEN 1 TABLET: 5; 325 TABLET ORAL at 05:01

## 2019-01-26 RX ADMIN — OXYCODONE AND ACETAMINOPHEN 1 TABLET: 5; 325 TABLET ORAL at 22:54

## 2019-01-26 NOTE — PROGRESS NOTES
Assumed care of this patient at 0000 and patient complaining of mild pain 4/10. Percocet given. Patient now complains of \"warmth in his chest\"/ denies chest pain. States it feels like indigestion. Patient also has mild fever of 100.5. Paged Dr. Collins Call and informed him on patients fever and he informed RN to recheck temp in 4 hours and to order MAALOX 15 mL Q 4 hours as needed for indigestion.

## 2019-01-26 NOTE — PROGRESS NOTES
Progress Note Patient: Elana Slater MRN: [id number]  SSN: [id number] YOB: 1985  Age: 34 y.o. Sex: male Admit Date: 2019 Perforated appendicitis Subjective: No acute surgical issues. Pt tolerating diet without nausea or vomiting. Pain is under control. Objective:  
 
Visit Vitals /65 (BP 1 Location: Right arm, BP Patient Position: At rest) Pulse 61 Temp 97.8 °F (36.6 °C) Resp 16 Wt 204 lb (92.5 kg) SpO2 98% BMI 26.19 kg/m² Temp (24hrs), Av.5 °F (36.9 °C), Min:97.3 °F (36.3 °C), Max:100.5 °F (38.1 °C) Physical Exam:   
Gen:  NAD Pulm:  Unlabored Abd:  S/ND/mild TTP in RLQ Recent Results (from the past 24 hour(s)) CBC WITH AUTOMATED DIFF Collection Time: 19  3:40 AM  
Result Value Ref Range WBC 7.4 4.1 - 11.1 K/uL  
 RBC 4.34 4.10 - 5.70 M/uL  
 HGB 13.6 12.1 - 17.0 g/dL HCT 40.1 36.6 - 50.3 % MCV 92.4 80.0 - 99.0 FL  
 MCH 31.3 26.0 - 34.0 PG  
 MCHC 33.9 30.0 - 36.5 g/dL  
 RDW 11.1 (L) 11.5 - 14.5 % PLATELET 139 531 - 982 K/uL MPV 10.3 8.9 - 12.9 FL  
 NRBC 0.0 0  WBC ABSOLUTE NRBC 0.00 0.00 - 0.01 K/uL NEUTROPHILS 64 32 - 75 % LYMPHOCYTES 22 12 - 49 % MONOCYTES 12 5 - 13 % EOSINOPHILS 1 0 - 7 % BASOPHILS 0 0 - 1 % IMMATURE GRANULOCYTES 0 0.0 - 0.5 % ABS. NEUTROPHILS 4.7 1.8 - 8.0 K/UL  
 ABS. LYMPHOCYTES 1.7 0.8 - 3.5 K/UL  
 ABS. MONOCYTES 0.9 0.0 - 1.0 K/UL  
 ABS. EOSINOPHILS 0.1 0.0 - 0.4 K/UL  
 ABS. BASOPHILS 0.0 0.0 - 0.1 K/UL  
 ABS. IMM. GRANS. 0.0 0.00 - 0.04 K/UL  
 DF AUTOMATED Assessment:  
 
Hospital Problems  Never Reviewed Codes Class Noted POA Perforated appendicitis ICD-10-CM: K35.32 
ICD-9-CM: 540.0  2019 Unknown Appendicitis with abscess ICD-10-CM: K35.33 
ICD-9-CM: 540.1  2019 Unknown Plan/Recommendations/Medical Decision Making: - Perforated appendicitis:  No acute indication for operation - GI lite diet 
- continue antibiotic - Repeat CT scan tomorrow - Possible DC home tomorrow with oral antibiotic if CT scan shows improvement Signed By: Alex Johnson MD   
 January 26, 2019

## 2019-01-26 NOTE — PROGRESS NOTES
Bedside shift change report given to Kerri Johnson (oncoming nurse) by Natalya Ordonez (offgoing nurse). Report included the following information SBAR, Kardex, Intake/Output, MAR and Recent Results.

## 2019-01-27 ENCOUNTER — APPOINTMENT (OUTPATIENT)
Dept: CT IMAGING | Age: 34
DRG: 373 | End: 2019-01-27
Attending: SURGERY
Payer: COMMERCIAL

## 2019-01-27 VITALS
DIASTOLIC BLOOD PRESSURE: 62 MMHG | RESPIRATION RATE: 16 BRPM | SYSTOLIC BLOOD PRESSURE: 123 MMHG | WEIGHT: 204 LBS | HEART RATE: 55 BPM | OXYGEN SATURATION: 96 % | BODY MASS INDEX: 26.18 KG/M2 | HEIGHT: 74 IN | TEMPERATURE: 98.3 F

## 2019-01-27 LAB
BASOPHILS # BLD: 0 K/UL (ref 0–0.1)
BASOPHILS # BLD: 0 K/UL (ref 0–0.1)
BASOPHILS NFR BLD: 0 % (ref 0–1)
BASOPHILS NFR BLD: 0 % (ref 0–1)
DIFFERENTIAL METHOD BLD: ABNORMAL
DIFFERENTIAL METHOD BLD: ABNORMAL
EOSINOPHIL # BLD: 0.1 K/UL (ref 0–0.4)
EOSINOPHIL # BLD: 0.1 K/UL (ref 0–0.4)
EOSINOPHIL NFR BLD: 1 % (ref 0–7)
EOSINOPHIL NFR BLD: 2 % (ref 0–7)
ERYTHROCYTE [DISTWIDTH] IN BLOOD BY AUTOMATED COUNT: 11 % (ref 11.5–14.5)
ERYTHROCYTE [DISTWIDTH] IN BLOOD BY AUTOMATED COUNT: 11.1 % (ref 11.5–14.5)
HCT VFR BLD AUTO: 37.4 % (ref 36.6–50.3)
HCT VFR BLD AUTO: 40.1 % (ref 36.6–50.3)
HGB BLD-MCNC: 12.8 G/DL (ref 12.1–17)
HGB BLD-MCNC: 13.6 G/DL (ref 12.1–17)
IMM GRANULOCYTES # BLD AUTO: 0 K/UL (ref 0–0.04)
IMM GRANULOCYTES # BLD AUTO: 0 K/UL (ref 0–0.04)
IMM GRANULOCYTES NFR BLD AUTO: 0 % (ref 0–0.5)
IMM GRANULOCYTES NFR BLD AUTO: 1 % (ref 0–0.5)
LYMPHOCYTES # BLD: 1.5 K/UL (ref 0.8–3.5)
LYMPHOCYTES # BLD: 1.7 K/UL (ref 0.8–3.5)
LYMPHOCYTES NFR BLD: 22 % (ref 12–49)
LYMPHOCYTES NFR BLD: 35 % (ref 12–49)
MCH RBC QN AUTO: 31.3 PG (ref 26–34)
MCH RBC QN AUTO: 31.8 PG (ref 26–34)
MCHC RBC AUTO-ENTMCNC: 33.9 G/DL (ref 30–36.5)
MCHC RBC AUTO-ENTMCNC: 34.2 G/DL (ref 30–36.5)
MCV RBC AUTO: 92.4 FL (ref 80–99)
MCV RBC AUTO: 92.8 FL (ref 80–99)
MONOCYTES # BLD: 0.7 K/UL (ref 0–1)
MONOCYTES # BLD: 0.9 K/UL (ref 0–1)
MONOCYTES NFR BLD: 12 % (ref 5–13)
MONOCYTES NFR BLD: 16 % (ref 5–13)
NEUTS SEG # BLD: 1.9 K/UL (ref 1.8–8)
NEUTS SEG # BLD: 4.7 K/UL (ref 1.8–8)
NEUTS SEG NFR BLD: 46 % (ref 32–75)
NEUTS SEG NFR BLD: 64 % (ref 32–75)
NRBC # BLD: 0 K/UL (ref 0–0.01)
NRBC # BLD: 0 K/UL (ref 0–0.01)
NRBC BLD-RTO: 0 PER 100 WBC
NRBC BLD-RTO: 0 PER 100 WBC
PLATELET # BLD AUTO: 152 K/UL (ref 150–400)
PLATELET # BLD AUTO: 162 K/UL (ref 150–400)
PMV BLD AUTO: 10.3 FL (ref 8.9–12.9)
PMV BLD AUTO: 10.5 FL (ref 8.9–12.9)
RBC # BLD AUTO: 4.03 M/UL (ref 4.1–5.7)
RBC # BLD AUTO: 4.34 M/UL (ref 4.1–5.7)
WBC # BLD AUTO: 4.2 K/UL (ref 4.1–11.1)
WBC # BLD AUTO: 7.4 K/UL (ref 4.1–11.1)

## 2019-01-27 PROCEDURE — 74177 CT ABD & PELVIS W/CONTRAST: CPT

## 2019-01-27 PROCEDURE — 74011250636 HC RX REV CODE- 250/636: Performed by: SURGERY

## 2019-01-27 PROCEDURE — 74011000258 HC RX REV CODE- 258: Performed by: SURGERY

## 2019-01-27 PROCEDURE — 36415 COLL VENOUS BLD VENIPUNCTURE: CPT

## 2019-01-27 PROCEDURE — 74011250637 HC RX REV CODE- 250/637: Performed by: SURGERY

## 2019-01-27 PROCEDURE — 74011636320 HC RX REV CODE- 636/320: Performed by: RADIOLOGY

## 2019-01-27 PROCEDURE — 85025 COMPLETE CBC W/AUTO DIFF WBC: CPT

## 2019-01-27 RX ORDER — DOCUSATE SODIUM 100 MG/1
100 CAPSULE, LIQUID FILLED ORAL 2 TIMES DAILY
Qty: 30 CAP | Refills: 0 | Status: SHIPPED | OUTPATIENT
Start: 2019-01-27 | End: 2019-12-04

## 2019-01-27 RX ORDER — OXYCODONE AND ACETAMINOPHEN 5; 325 MG/1; MG/1
1 TABLET ORAL
Qty: 30 TAB | Refills: 0 | Status: SHIPPED | OUTPATIENT
Start: 2019-01-27 | End: 2019-12-04

## 2019-01-27 RX ORDER — AMOXICILLIN AND CLAVULANATE POTASSIUM 875; 125 MG/1; MG/1
1 TABLET, FILM COATED ORAL EVERY 12 HOURS
Qty: 20 TAB | Refills: 0 | Status: SHIPPED | OUTPATIENT
Start: 2019-01-27 | End: 2019-02-06

## 2019-01-27 RX ORDER — SODIUM CHLORIDE 0.9 % (FLUSH) 0.9 %
10 SYRINGE (ML) INJECTION
Status: DISCONTINUED | OUTPATIENT
Start: 2019-01-27 | End: 2019-01-27 | Stop reason: HOSPADM

## 2019-01-27 RX ADMIN — IOHEXOL 50 ML: 240 INJECTION, SOLUTION INTRATHECAL; INTRAVASCULAR; INTRAVENOUS; ORAL at 08:00

## 2019-01-27 RX ADMIN — OXYCODONE AND ACETAMINOPHEN 1 TABLET: 5; 325 TABLET ORAL at 03:31

## 2019-01-27 RX ADMIN — IOPAMIDOL 100 ML: 755 INJECTION, SOLUTION INTRAVENOUS at 12:41

## 2019-01-27 RX ADMIN — PIPERACILLIN SODIUM,TAZOBACTAM SODIUM 3.38 G: 3; .375 INJECTION, POWDER, FOR SOLUTION INTRAVENOUS at 12:01

## 2019-01-27 RX ADMIN — SODIUM CHLORIDE, SODIUM LACTATE, POTASSIUM CHLORIDE, AND CALCIUM CHLORIDE 100 ML/HR: 600; 310; 30; 20 INJECTION, SOLUTION INTRAVENOUS at 07:16

## 2019-01-27 RX ADMIN — PIPERACILLIN SODIUM,TAZOBACTAM SODIUM 3.38 G: 3; .375 INJECTION, POWDER, FOR SOLUTION INTRAVENOUS at 03:20

## 2019-01-27 NOTE — PROGRESS NOTES
1/27/2019 RE: Vini Parikh To Whom it May Concern: This is to certify that Vini Parikh was admitted to the hospital for an illness on January 24, 2019. He may return to light duty half day work starting January 31, 2019. He may return to full duty work starting February 4, 2019. Please feel free to contact my office at [phone] if you have any questions or concerns. Thank you for your assistance in this matter. Sincerely, Jaqueline Fuller MD

## 2019-01-27 NOTE — PROGRESS NOTES
Progress Note Patient: Lorenza Yang MRN: [id number]  SSN: [id number] YOB: 1985  Age: 34 y.o. Sex: male Admit Date: 2019 Perforated appendicitis Subjective: No acute surgical issues. Ct scan showed improvement in appendiceal abscess. No fevers. Tolerating diet without nausea or vomiting. Objective:  
 
Visit Vitals /62 (BP 1 Location: Left arm, BP Patient Position: At rest) Pulse (!) 55 Temp 98.3 °F (36.8 °C) Resp 16 Ht 6' 2.02\" (1.88 m) Wt 204 lb (92.5 kg) SpO2 96% BMI 26.18 kg/m² Temp (24hrs), Av °F (36.7 °C), Min:97.3 °F (36.3 °C), Max:98.5 °F (36.9 °C) Physical Exam:   
Gen:  NAD Pulm:  Unlabored Abd:  S/ND/mild TTP in RLQ Recent Results (from the past 24 hour(s)) CBC WITH AUTOMATED DIFF Collection Time: 19  3:20 AM  
Result Value Ref Range WBC 4.2 4.1 - 11.1 K/uL  
 RBC 4.03 (L) 4.10 - 5.70 M/uL  
 HGB 12.8 12.1 - 17.0 g/dL HCT 37.4 36.6 - 50.3 % MCV 92.8 80.0 - 99.0 FL  
 MCH 31.8 26.0 - 34.0 PG  
 MCHC 34.2 30.0 - 36.5 g/dL  
 RDW 11.0 (L) 11.5 - 14.5 % PLATELET 406 926 - 819 K/uL MPV 10.5 8.9 - 12.9 FL  
 NRBC 0.0 0  WBC ABSOLUTE NRBC 0.00 0.00 - 0.01 K/uL NEUTROPHILS 46 32 - 75 % LYMPHOCYTES 35 12 - 49 % MONOCYTES 16 (H) 5 - 13 % EOSINOPHILS 2 0 - 7 % BASOPHILS 0 0 - 1 % IMMATURE GRANULOCYTES 1 (H) 0.0 - 0.5 % ABS. NEUTROPHILS 1.9 1.8 - 8.0 K/UL  
 ABS. LYMPHOCYTES 1.5 0.8 - 3.5 K/UL  
 ABS. MONOCYTES 0.7 0.0 - 1.0 K/UL  
 ABS. EOSINOPHILS 0.1 0.0 - 0.4 K/UL  
 ABS. BASOPHILS 0.0 0.0 - 0.1 K/UL  
 ABS. IMM. GRANS. 0.0 0.00 - 0.04 K/UL  
 DF AUTOMATED    
CBC WITH AUTOMATED DIFF Collection Time: 19  3:40 AM  
Result Value Ref Range WBC 7.4 4.1 - 11.1 K/uL  
 RBC 4.34 4.10 - 5.70 M/uL  
 HGB 13.6 12.1 - 17.0 g/dL HCT 40.1 36.6 - 50.3 % MCV 92.4 80.0 - 99.0 FL  
 MCH 31.3 26.0 - 34.0 PG  
 MCHC 33.9 30.0 - 36.5 g/dL  
 RDW 11.1 (L) 11.5 - 14.5 % PLATELET 879 915 - 222 K/uL MPV 10.3 8.9 - 12.9 FL  
 NRBC 0.0 0  WBC ABSOLUTE NRBC 0.00 0.00 - 0.01 K/uL NEUTROPHILS 64 32 - 75 % LYMPHOCYTES 22 12 - 49 % MONOCYTES 12 5 - 13 % EOSINOPHILS 1 0 - 7 % BASOPHILS 0 0 - 1 % IMMATURE GRANULOCYTES 0 0.0 - 0.5 % ABS. NEUTROPHILS 4.7 1.8 - 8.0 K/UL  
 ABS. LYMPHOCYTES 1.7 0.8 - 3.5 K/UL  
 ABS. MONOCYTES 0.9 0.0 - 1.0 K/UL  
 ABS. EOSINOPHILS 0.1 0.0 - 0.4 K/UL  
 ABS. BASOPHILS 0.0 0.0 - 0.1 K/UL  
 ABS. IMM. GRANS. 0.0 0.00 - 0.04 K/UL  
 DF AUTOMATED Assessment:  
 
Hospital Problems  Never Reviewed Codes Class Noted POA Perforated appendicitis ICD-10-CM: K35.32 
ICD-9-CM: 540.0  1/25/2019 Unknown Appendicitis with abscess ICD-10-CM: K35.33 
ICD-9-CM: 540.1  1/24/2019 Unknown Plan/Recommendations/Medical Decision Making: - Perforated appendicitis:  CT scan showed improvement in periappendiceal abscess. No acute indication for operation. Interval appendectomy in 6-8 weeks - Gi lite diet 
- Pain control - Plan DC home this afternoon with oral antibiotic Signed By: Shabnam Castro MD   
 January 27, 2019

## 2019-01-27 NOTE — PROGRESS NOTES
Pt called out multiple times from 10am on until now (1230) to find out when he would be going to CT. I notified the patient several times to ensure that he knows that CT will call as soon as they are ready for him. Pt remained upset and wanted me to call the MD/CT to force them to take him now. Before I was able to call. [facility] had arrived to take patient to CT.

## 2019-01-27 NOTE — PROGRESS NOTES
Patient has been NPO since midnight for scheduled CT this am. 
 
Bedside shift change report given to Jona BAI RN (oncoming nurse) by Meggan Nogueira (offgoing nurse). Report included the following information SBAR, Kardex, Intake/Output, MAR and Recent Results.

## 2019-01-27 NOTE — DISCHARGE INSTRUCTIONS
1.  Do not drive while on pain medication. You should take a stool softener while on pain medication to prevent constipation. 2.  Light activity and no contact sports for 2 weeks. 3.  You may resume your home medications. 4.  You will need to take Augmentin antibiotic for 10 days. You should eat a cup of yogurt or take a probiotic while on antibiotic to prevent diarrhea. 5.  Please call [phone] to schedule a follow-up with Dr. Naty Gillette to discuss interval appendectomy. Patient Education        Appendicitis: Care Instructions  Your Care Instructions    Your doctor thinks you may have appendicitis. This means that your appendix may be infected. The appendix is a small sac that is shaped like a finger. It's attached to your large intestine. Sometimes it's hard to tell if a person has appendicitis. It is especially hard to tell in children, pregnant women, and older adults. If your doctor thinks it's possible that you have appendicitis, he or she may want to order more tests. Or your doctor may want to wait to see if your symptoms change. Your doctor thinks it's okay for you to go home right now. But you will need to watch for symptoms of appendicitis at home. If your symptoms continue or get worse, it's important to call your doctor or get medical care right away. Appendicitis can get serious very quickly. The main treatment is surgery to remove your appendix. Follow-up care is a key part of your treatment and safety. Be sure to make and go to all appointments, and call your doctor if you are having problems. It's also a good idea to know your test results and keep a list of the medicines you take. How can you care for yourself at home? · Do not eat or drink, unless your doctor says it is okay. If you need surgery, it's best to have an empty stomach. If you're thirsty, you can rinse your mouth with water. Or you can suck on hard candy. · Do not take laxatives.  If you have appendicitis, they can make the appendix burst.  · Follow your doctor's instructions about taking medicines. Your doctor may tell you not to take antibiotics or pain pills. These medicines can make it harder to tell if you have appendicitis. · Watch for symptoms of appendicitis. See the When should you call for help section below. It is very important to follow your doctor's instructions about getting treatment if you have these symptoms. When should you call for help? Call your doctor now or seek immediate medical care if:    · You have pain that becomes focused on one area of your belly.     · You have new or worse belly pain.     · You are vomiting.     · You have a fever.     · You cannot pass stools or gas.    Watch closely for changes in your health, and be sure to contact your doctor if:    · You do not get better as expected. Where can you learn more? Go to http://destinee-aurelia.info/. Enter U508 in the search box to learn more about \"Possible Appendicitis: Care Instructions. \"  Current as of: March 27, 2018  Content Version: 11.9  © 1782-0371 [employer]. Care instructions adapted under license by [employer] (which disclaims liability or warranty for this information). If you have questions about a medical condition or this instruction, always ask your healthcare professional. [address] any warranty or liability for your use of this information.

## 2019-01-27 NOTE — PROGRESS NOTES
Pt discharged to home per Dr. Enedina Chavis. Pt educated on follow up appointments and scripts of docusate, percocet and amoxicillin, as well as a work excuse.

## 2019-01-28 ENCOUNTER — TELEPHONE (OUTPATIENT)
Dept: SURGERY | Age: 34
End: 2019-01-28

## 2019-01-28 NOTE — TELEPHONE ENCOUNTER
Spoke with patient who wanted to know when was his follow up appointment. Informed patient his follow up appointment with Dr. William Nascimento is 2/7/2019 @ [phone].

## 2019-01-28 NOTE — TELEPHONE ENCOUNTER
Patient called to schedule a follow up appointment with Dr. Jonh Abdul. He's not sure when he's to come in for an appointment.

## 2019-01-30 ENCOUNTER — TELEPHONE (OUTPATIENT)
Dept: FAMILY MEDICINE CLINIC | Age: 34
End: 2019-01-30

## 2019-01-30 NOTE — TELEPHONE ENCOUNTER
Patient is calling asking if Nia Manzanares can prescribe him a medication, patient states that he has 2 zists located on the forehead. Laney Mckeon callback:  [phone]      LOV: Thursday, January 24, 2019

## 2019-01-31 ENCOUNTER — TELEPHONE (OUTPATIENT)
Dept: FAMILY MEDICINE CLINIC | Age: 34
End: 2019-01-31

## 2019-01-31 NOTE — TELEPHONE ENCOUNTER
[phone] spoke to patient advised needs office visit for evaluation patient has appointment 2/1/2019 at 11:45AM patient understand

## 2019-01-31 NOTE — TELEPHONE ENCOUNTER
Pt. is calling requesting a call back in regards to a swollen in the R side of his face and was wondering if he needs to come in to be seen.      Best call #[phone]

## 2019-02-01 ENCOUNTER — TELEPHONE (OUTPATIENT)
Dept: FAMILY MEDICINE CLINIC | Age: 34
End: 2019-02-01

## 2019-02-01 ENCOUNTER — OFFICE VISIT (OUTPATIENT)
Dept: FAMILY MEDICINE CLINIC | Age: 34
End: 2019-02-01

## 2019-02-01 VITALS
OXYGEN SATURATION: 99 % | DIASTOLIC BLOOD PRESSURE: 82 MMHG | HEIGHT: 74 IN | RESPIRATION RATE: 16 BRPM | BODY MASS INDEX: 26.31 KG/M2 | HEART RATE: 82 BPM | WEIGHT: 205 LBS | TEMPERATURE: 98.5 F | SYSTOLIC BLOOD PRESSURE: 136 MMHG

## 2019-02-01 DIAGNOSIS — B02.9 HERPES ZOSTER WITHOUT COMPLICATION: Primary | ICD-10-CM

## 2019-02-01 DIAGNOSIS — K35.32 PERFORATED APPENDICITIS: ICD-10-CM

## 2019-02-01 RX ORDER — VALACYCLOVIR HYDROCHLORIDE 1 G/1
1000 TABLET, FILM COATED ORAL 3 TIMES DAILY
Qty: 21 TAB | Refills: 0 | Status: SHIPPED | OUTPATIENT
Start: 2019-02-01 | End: 2019-02-08

## 2019-02-01 NOTE — TELEPHONE ENCOUNTER
----- Message from Evangelina De Anda sent at 2/1/2019  5:20 PM EST -----  Regarding: Np.Wichman/Telephone  Pt requesting a call back regarding what does he do if his sculp itches due to the shingles.  Best contact:[phone]

## 2019-02-01 NOTE — TELEPHONE ENCOUNTER
Antiviral medication started today for shingles. Plan as discussed in office; antiviral medication, topical OTC hydrocortisone for itching, NSAID's for pain PRN. Return for office visit next week if symptoms do not start to improve or return for urgent evaluation if symptoms worsen. no

## 2019-02-01 NOTE — PATIENT INSTRUCTIONS
Start antiviral medication. NSAID's, Aleve or Motrin as needed for pain. Topical hydrocortisone PRN itching. Call or return to clinic if symptoms worsen. Return urgently if you experience eye pain or vision changes. Shingles: Care Instructions  Your Care Instructions    Shingles (herpes zoster) causes pain and a blistered rash. The rash can appear anywhere on the body but will be on only one side of the body, the left or right. It will be in a band, a strip, or a small area. The pain can be very severe. Shingles can also cause tingling or itching in the area of the rash. The blisters scab over after a few days and heal in 2 to 4 weeks. Medicines can help you feel better and may help prevent more serious problems caused by shingles. Shingles is caused by the same virus that causes chickenpox. When you have chickenpox, the virus gets into your nerve roots and stays there (becomes dormant) long after you get over the chickenpox. If the virus becomes active again, it can cause shingles. Follow-up care is a key part of your treatment and safety. Be sure to make and go to all appointments, and call your doctor if you are having problems. It's also a good idea to know your test results and keep a list of the medicines you take. How can you care for yourself at home? · Be safe with medicines. Take your medicines exactly as prescribed. Call your doctor if you think you are having a problem with your medicine. Antiviral medicine helps you get better faster. · Try not to scratch or pick at the blisters. They will crust over and fall off on their own if you leave them alone. · Put cool, wet cloths on the area to relieve pain and itching. You can also use calamine lotion. Try not to use so much lotion that it cakes and is hard to get off. · Put cornstarch or baking soda on the sores to help dry them out so they heal faster. · Do not use thick ointment, such as petroleum jelly, on the sores.  This will keep them from drying and healing. · To help remove loose crusts, soak them in tap water. This can help decrease oozing, and dry and soothe the skin. · Take an over-the-counter pain medicine, such as acetaminophen (Tylenol), ibuprofen (Advil, Motrin), or naproxen (Aleve). Read and follow all instructions on the label. · Avoid close contact with people until the blisters have healed. It is very important for you to avoid contact with anyone who has never had chickenpox or the chickenpox vaccine. Pregnant women, young babies, and anyone else who has a hard time fighting infection (such as someone with HIV, diabetes, or cancer) is especially at risk. When should you call for help? Call your doctor now or seek immediate medical care if:    · You have a new or higher fever.     · You have a severe headache and a stiff neck.     · You lose the ability to think clearly.     · The rash spreads to your forehead, nose, eyes, or eyelids.     · You have eye pain, or your vision gets worse.     · You have new pain in your face, or you cannot move the muscles in your face.     · Blisters spread to new parts of your body.    Watch closely for changes in your health, and be sure to contact your doctor if:    · The rash has not healed after 2 to 4 weeks.     · You still have pain after the rash has healed. Where can you learn more? Go to http://destinee-aurelia.info/. Temo Limon in the search box to learn more about \"Shingles: Care Instructions. \"  Current as of: July 30, 2018  Content Version: 11.9  © 4825-9123 [employer]. Care instructions adapted under license by [employer] (which disclaims liability or warranty for this information). If you have questions about a medical condition or this instruction, always ask your healthcare professional. [address] any warranty or liability for your use of this information.

## 2019-02-01 NOTE — TELEPHONE ENCOUNTER
Pt. is calling requesting a call back in regards to the shingles that he was seen here for today. He wants to inform us that his shingles is itching in his scalp.       Best call #[phone]

## 2019-02-01 NOTE — PROGRESS NOTES
[facility] Note  Subjective: She Yang is a 34 y.o. male who presents for an acute visit with the following chief complaints. Chief Complaint   Patient presents with    Skin Problem     pt states he has a lump on his forehead and in his scalp while staying in hospital .  seems to be getting bigger. Rash  Patient complains of rash involving the face. Rash started 6 days ago. Appearance of rash at onset: Initially he noted 2 small areas of redness and swelling on right forehead that looked similar to acne pimples. Over the next several days, he notes that the bumps grew in size and redness. Then a third bump developed in the middle of the right eye brow. Over the past 24-28 hours he notes small blister like lesions on the largest bump in the mid right forehead. Discomfort associated with rash: appears to be itching and aching mostly after manipulation. Associated symptoms: other: generalized malaise, onse of runny nose, sneezing a few days ago. Denies: fever, cough, congestion, sore throat, headache, arthralgia, abdominal pain, nausea, vomiting. Patient has not had previous evaluation of rash. Patient has had previous treatment. Tried topical OTC acne cream. Response to treatment: None. Patient has not had contacts with similar rash. Patient has not identified precipitant. Patient has not had new exposures (soaps, lotions, laundry detergents, foods, medications, plants, insects or animals.)      Evaluated here 7 days ago with abdominal pain, CT revealed perforated appendicitis without abcess and he was referred to ER. Improvement of appendicitis with IV antibiotics, discharged home on oral antibiotic. Follow-up with general surgery scheduled for next week. Plans for lap appendectomy in 5 weeks. No abdominal pain at present. Tolerating PO intake. No N/V/D. Overall feels better but still feels generally more tired than usually.        Current Outpatient Medications Medication Sig Dispense Refill    valACYclovir (VALTREX) 1 gram tablet Take 1 Tab by mouth three (3) times daily for 7 days. 21 Tab 0    amoxicillin-clavulanate (AUGMENTIN) 875-125 mg per tablet Take 1 Tab by mouth every twelve (12) hours for 10 days. 20 Tab 0    multivitamin with iron (DAILY MULTI-VITAMINS/IRON) tablet Take 1 Tab by mouth daily.  calcium-magnesium-zinc 333-133-5 mg tab Take  by mouth.  cholecalciferol (VITAMIN D3) 1,000 unit cap Take  by mouth daily.  oxyCODONE-acetaminophen (PERCOCET) 5-325 mg per tablet Take 1 Tab by mouth every four (4) hours as needed. Max Daily Amount: 6 Tabs. 30 Tab 0    docusate sodium (COLACE) 100 mg capsule Take 1 Cap by mouth two (2) times a day. 30 Cap 0     Allergies   Allergen Reactions    Soy Other (comments)       ROS:   Complete review of systems was reviewed with pertinent information listed in HPI. Review of Systems   Constitutional: Negative for chills, diaphoresis, fever, malaise/fatigue and weight loss. HENT: Negative for congestion, ear pain, hearing loss, sinus pain, sore throat and tinnitus. Eyes: Negative for blurred vision and double vision. Respiratory: Negative for shortness of breath. Cardiovascular: Negative for chest pain, palpitations and leg swelling. Gastrointestinal: Negative for abdominal pain, blood in stool, constipation, diarrhea, heartburn, melena, nausea and vomiting. Genitourinary: Negative for dysuria, frequency, hematuria and urgency. Musculoskeletal: Negative for joint pain and myalgias. Skin: Positive for itching and rash. Neurological: Negative for dizziness, tingling, weakness and headaches. Endo/Heme/Allergies: Negative for polydipsia. Psychiatric/Behavioral: Negative.         Objective:     Visit Vitals  /82 (BP 1 Location: Left arm, BP Patient Position: Sitting)   Pulse 82   Temp 98.5 °F (36.9 °C) (Oral)   Resp 16   Ht 6' 2\" (1.88 m)   Wt 205 lb (93 kg)   SpO2 99%   BMI 26.32 kg/m²       Vitals and Nurse Documentation reviewed. Physical Exam   Constitutional: He is oriented to person, place, and time and well-developed, well-nourished, and in no distress. HENT:   Head: Normocephalic and atraumatic. Cardiovascular: Normal rate. Pulmonary/Chest: Effort normal.   Neurological: He is alert and oriented to person, place, and time. Gait normal.   Skin: Skin is warm, dry and intact. Rash noted. 3 areas or erythema of right forehead; one on scalp line, one of the middle right forehead, one of mid right eyebrow. Largest of mid forehead is approximately 1 cm x 1 cm erythematous base with cluster of small vesicle lesions. Psychiatric: Mood, memory, affect and judgment normal.   Nursing note and vitals reviewed. Assessment/Plan:     Diagnoses and all orders for this visit:    1. Herpes zoster without complication: Localized herpes zoster of the right forehead. No occular involvement. Start antiviral therapy. Topical OTC hydrocortisone for itching, NSAID's PRN discomfort. Discussed red flags that would prompt urgent medical evaluation include vision changes, eye discomfort. RTC if symptoms worsen or do not resolve. -     valACYclovir (VALTREX) 1 gram tablet; Take 1 Tab by mouth three (3) times daily for 7 days. 2. Perforated appendicitis: Recent admission for appendicitis s/p IV antibiotics with improvement on repeat CT scan. On oral antibiotics, tolerating GI lite diet. Asymptomatic. Close follow-up with general surgery next week and plans for lap parul in the next 6-8 weeks    Follow-up Disposition:  Return if symptoms worsen or fail to improve.     Discussed expected course/resolution/complications of diagnosis in detail with patient.    Medication risks/benefits/costs/interactions/alternatives discussed with patient.    Pt was given an after visit summary which includes diagnoses, current medications & vitals.  Pt expressed understanding with the diagnosis and plan

## 2019-02-01 NOTE — PROGRESS NOTES
Chief Complaint   Patient presents with    Skin Problem     pt states he has a lump on his forehead and in his scalp while staying in hospital .  seems to be getting bigger. \"REVIEWED RECORD IN PREPARATION FOR VISIT AND HAVE OBTAINED THE NECESSARY DOCUMENTATION\"  1. Have you been to the ER, urgent care clinic since your last visit? Hospitalized since your last visit? Yes Reason for visit: admitted [facility] for abscess and appendicitis. 2. Have you seen or consulted any other health care providers outside of the [address] [place] [address] since your last visit? Include any pap smears or colon screening.  No

## 2019-02-04 ENCOUNTER — TELEPHONE (OUTPATIENT)
Dept: SURGERY | Age: 34
End: 2019-02-04

## 2019-02-04 NOTE — DISCHARGE SUMMARY
Physician Discharge Summary     Patient ID:  Paul Montiel  [id number]  67 y.o.  1985    Allergies: Soy    Admit Date: 1/24/2019    Discharge Date: 1/27/2019    * Admission Diagnoses: Appendicitis with abscess; Perforated appendicitis    * Discharge Diagnoses:    Hospital Problems as of 1/27/2019 Never Reviewed          Codes Class Noted - Resolved POA    Perforated appendicitis ICD-10-CM: K35.32  ICD-9-CM: 540.0  1/25/2019 - Present Unknown        Appendicitis with abscess ICD-10-CM: K35.33  ICD-9-CM: 540.1  1/24/2019 - Present Unknown               Admission Condition: Fair    * Discharge Condition: improved    * Procedures: * No surgery found [facility] Course: The patient presented with abdominal pain for about 10 days. He was found to have phlegmenous appendicitis. He was admitted and placed on IV abx. He clinically improved. He was placed on a diet which he tolerated. Repeat Ct scan on day of discharge was stable. He was then discharged on oral Abx. Consults: None    Significant Diagnostic Studies: radiology: CT scan: phlegmenous appendicitis    * Disposition: Home    Discharge Medications:   Discharge Medication List as of 1/27/2019  3:08 PM      START taking these medications    Details   oxyCODONE-acetaminophen (PERCOCET) 5-325 mg per tablet Take 1 Tab by mouth every four (4) hours as needed. Max Daily Amount: 6 Tabs., Print, Disp-30 Tab, R-0      amoxicillin-clavulanate (AUGMENTIN) 875-125 mg per tablet Take 1 Tab by mouth every twelve (12) hours for 10 days. , Print, Disp-20 Tab, R-0      docusate sodium (COLACE) 100 mg capsule Take 1 Cap by mouth two (2) times a day., Print, Disp-30 Cap, R-0         CONTINUE these medications which have NOT CHANGED    Details   multivitamin with iron (DAILY MULTI-VITAMINS/IRON) tablet Take 1 Tab by mouth daily. , Historical Med      calcium-magnesium-zinc 333-133-5 mg tab Take  by mouth., Historical Med      cholecalciferol (VITAMIN D3) 1,000 unit cap Take by mouth daily. , Historical Med             * Follow-up Care/Patient Instructions:   Activity: Activity as tolerated  Diet: Resume previous diet  Wound Care: None needed    Follow-up Information     Follow up With Specialties Details Why Contact Info    Ilya Douglas NP Nurse Practitioner   [facility] [address] [place] [address]  [phone]          Follow-up tests/labs     Signed:  Subhash Chávez MD  2/3/2019  8:53 PM .

## 2019-02-04 NOTE — TELEPHONE ENCOUNTER
Selma Rand called patient 2/1/2019 and left him a VM in regards to his message see telephone encounter 2/1/2019

## 2019-02-07 NOTE — TELEPHONE ENCOUNTER
Left message informing case working patient's appointment has been changed per Dr. Sherrell Forbes from Dr. Sherrell Forbes 2/7/19 to Dr. Silvio Marques  2/11/19. Will update if needed and or she can return call if any other questions.

## 2019-02-11 ENCOUNTER — TELEPHONE (OUTPATIENT)
Dept: SURGERY | Age: 34
End: 2019-02-11

## 2019-02-11 NOTE — TELEPHONE ENCOUNTER
Patient has some concerns with having his surgery completed. He has rescheduled numerous times and needs a clear idea of when he needs to be operated given his tight scheduled.

## 2019-02-12 ENCOUNTER — TELEPHONE (OUTPATIENT)
Dept: SURGERY | Age: 34
End: 2019-02-12

## 2019-02-12 NOTE — TELEPHONE ENCOUNTER
Called patient to advise him that Dr. Deepika Fsoter has surgery case before clinic at [facility] on Thursday. Patient requested that his appointment be cancelled on Thursday. I offered to see if I could get him in to be seen today and he declined and stated that he will go elsewhere. I apologized to patient.

## 2019-02-13 NOTE — PROGRESS NOTES
Chief Complaint   Patient presents with    Sore Throat     rm 5 patient believes he may have a sinus infection      1. Have you been to the ER, urgent care clinic since your last visit? Hospitalized since your last visit? No    2. Have you seen or consulted any other health care providers outside of the [address] [place] [address] since your last visit? Include any pap smears or colon screening.  No normal...

## 2019-12-04 ENCOUNTER — OFFICE VISIT (OUTPATIENT)
Dept: FAMILY MEDICINE CLINIC | Age: 34
End: 2019-12-04

## 2019-12-04 VITALS
RESPIRATION RATE: 16 BRPM | HEIGHT: 74 IN | OXYGEN SATURATION: 99 % | HEART RATE: 75 BPM | BODY MASS INDEX: 27.64 KG/M2 | DIASTOLIC BLOOD PRESSURE: 69 MMHG | SYSTOLIC BLOOD PRESSURE: 119 MMHG | TEMPERATURE: 97.9 F | WEIGHT: 215.4 LBS

## 2019-12-04 DIAGNOSIS — N50.89 SCROTAL MASS: Primary | ICD-10-CM

## 2019-12-04 NOTE — PATIENT INSTRUCTIONS
Testicular Mass: Care Instructions Your Care Instructions A lump or mass in your testicle may be a minor problem, or it may be more serious. Minor causes include a buildup of fluid, a cyst, or a varicose vein in your scrotum. More serious causes include infection or a tumor. You may have an ultrasound, an X-ray, or a CT scan to find out what is causing the mass. Or you may have a blood test. 
Follow-up care is a key part of your treatment and safety. Be sure to make and go to all appointments, and call your doctor if you are having problems. It's also a good idea to know your test results and keep a list of the medicines you take. How can you care for yourself at home? · Take your medicine exactly as prescribed. If your doctor prescribed antibiotics, take them as directed. Do not stop taking them just because you feel better. You need to take the full course of antibiotics. · Be safe with medicines. Take pain medicines exactly as directed. ? If the doctor gave you a prescription medicine for pain, take it as prescribed. ? If you are not taking a prescription pain medicine, take an over-the-counter medicine such as acetaminophen (Tylenol), ibuprofen (Advil, Motrin), or naproxen (Aleve). Read and follow all instructions on the label. ? Do not take two or more pain medicines at the same time unless the doctor told you to. Many pain medicines have acetaminophen, which is Tylenol. Too much acetaminophen (Tylenol) can be harmful. · Learn how to check your testicles so you can see if the lump changes. When should you call for help? Call your doctor now or seek immediate medical care if: 
  · You have severe pain in a testicle. It could be a sign of a twisted testicle. This is an emergency.  
  · You have new or worsening pain in a testicle.  
 Watch closely for changes in your health, and be sure to contact your doctor if: 
  · You have a new or growing mass in a testicle.   · You see a change in a testicle.  
  · You are not getting better as expected. Where can you learn more? Go to http://destinee-aurelia.info/. Enter T268 in the search box to learn more about \"Testicular Mass: Care Instructions. \" Current as of: December 19, 2018 Content Version: 12.2 © 3059-4919 [employer]. Care instructions adapted under license by [employer] (which disclaims liability or warranty for this information). If you have questions about a medical condition or this instruction, always ask your healthcare professional. [address] any warranty or liability for your use of this information.

## 2019-12-04 NOTE — PROGRESS NOTES
[facility] Note  Subjective: Tammi Mazariegos is a 30 y.o. male who presents for an acute visit with the following chief complaints. Chief Complaint   Patient presents with    Other     patient reports that he has a cyst on right groin, near scrotum x 3-4 weeks - no pain       He complains of right scrotal nodule. Onset 3-4 weeks. Has not changed at all since onset. Area feels like a bulge, tiny bit of redness. Non-tender. Denies tenderness or pain. Treatments: warm compress with little relied. Current Outpatient Medications   Medication Sig Dispense Refill    calcium-magnesium-zinc 333-133-5 mg tab Take  by mouth daily. Allergies   Allergen Reactions    Soy Other (comments)     Diarrhea, gas       ROS:   Complete review of systems was reviewed with pertinent information listed in HPI. Review of Systems   Constitutional: Negative for chills, diaphoresis, fever, malaise/fatigue and weight loss. HENT: Negative for congestion, ear pain, hearing loss, sinus pain, sore throat and tinnitus. Eyes: Negative for blurred vision and double vision. Respiratory: Negative for shortness of breath. Cardiovascular: Negative for chest pain, palpitations and leg swelling. Gastrointestinal: Negative for abdominal pain, blood in stool, constipation, diarrhea, heartburn, melena, nausea and vomiting. Genitourinary: Negative for dysuria, flank pain, frequency, hematuria and urgency. See HPI   Musculoskeletal: Negative for joint pain and myalgias. Skin: Negative. Negative for itching and rash. Neurological: Negative for dizziness, tingling, weakness and headaches. Endo/Heme/Allergies: Negative for polydipsia. Psychiatric/Behavioral: Negative.         Objective:     Visit Vitals  /69 (BP 1 Location: Right arm, BP Patient Position: Sitting)   Pulse 75   Temp 97.9 °F (36.6 °C) (Oral)   Resp 16   Ht 6' 2\" (1.88 m)   Wt 215 lb 6.4 oz (97.7 kg) SpO2 99%   BMI 27.66 kg/m²       Vitals and Nurse Documentation reviewed. Physical Exam  Abdominal:      Hernia: There is no hernia in the right inguinal area or left inguinal area. Genitourinary:     Penis: Normal and circumcised. Scrotum/Testes:         Right: Mass, tenderness, testicular hydrocele or varicocele not present. Left: Mass, tenderness, swelling, testicular hydrocele or varicocele not present. Epididymis:      Right: Not inflamed or enlarged. No mass or tenderness. Left: Not inflamed or enlarged. No mass or tenderness. Lymphadenopathy:      Lower Body: No right inguinal adenopathy. No left inguinal adenopathy. Assessment/Plan:     Diagnoses and all orders for this visit:    1. Scrotal mass: Acute right superior sided scrotal mass. Unclear etiology. Does not appear to be inguinal hernia, low suspicious for abscess or varicocele. No lymphadenopathy present on exam. Will proceed with ultrasound and urology referral - he is agreeable to plan of care. -     US SCROTUM/TESTICLES; Future  -     REFERRAL TO UROLOGY    Follow-up and Dispositions    · Return if symptoms worsen or fail to improve.        Discussed expected course/resolution/complications of diagnosis in detail with patient.    Medication risks/benefits/costs/interactions/alternatives discussed with patient.    Pt was given an after visit summary which includes diagnoses, current medications & vitals.  Pt expressed understanding with the diagnosis and plan

## 2019-12-04 NOTE — PROGRESS NOTES
Chief Complaint   Patient presents with    Other     patient reports that he has a cyst on right groin, near scrotum x 3-4 weeks - no pain     1. Have you been to the ER, urgent care clinic since your last visit? Hospitalized since your last visit? No    2. Have you seen or consulted any other health care providers outside of the [address] [place] [address] since your last visit? Include any pap smears or colon screening.  No

## 2021-01-20 ENCOUNTER — OFFICE VISIT (OUTPATIENT)
Dept: FAMILY MEDICINE CLINIC | Age: 36
End: 2021-01-20
Payer: COMMERCIAL

## 2021-01-20 VITALS
OXYGEN SATURATION: 98 % | TEMPERATURE: 99 F | HEIGHT: 74 IN | WEIGHT: 201.2 LBS | HEART RATE: 61 BPM | RESPIRATION RATE: 18 BRPM | DIASTOLIC BLOOD PRESSURE: 75 MMHG | BODY MASS INDEX: 25.82 KG/M2 | SYSTOLIC BLOOD PRESSURE: 138 MMHG

## 2021-01-20 DIAGNOSIS — Z13.220 SCREENING, LIPID: ICD-10-CM

## 2021-01-20 DIAGNOSIS — Z00.00 ROUTINE GENERAL MEDICAL EXAMINATION AT A HEALTH CARE FACILITY: Primary | ICD-10-CM

## 2021-01-20 PROCEDURE — 99395 PREV VISIT EST AGE 18-39: CPT | Performed by: NURSE PRACTITIONER

## 2021-01-20 NOTE — PATIENT INSTRUCTIONS
When are you eligible for COVID vaccination? Sign up with the link below to be notified. [email or web address]

## 2021-01-20 NOTE — PROGRESS NOTES
Assessment/Plan:     Diagnoses and all orders for this visit:    1. Routine general medical examination at a health care facility  -     CBC WITH AUTOMATED DIFF; Future  -     METABOLIC PANEL, COMPREHENSIVE; Future    2. Screening, lipid  -     LIPID PANEL; Future               Follow-up and Dispositions    · Return in about 1 year (around 1/20/2022) for Complete Physical.         Discussed expected course/resolution/complications of diagnosis in detail with patient.    Medication risks/benefits/costs/interactions/alternatives discussed with patient.    Pt was given after visit summary which includes diagnoses, current medications & vitals. Pt expressed understanding with the diagnosis and plan    HPI:   Kyle Oneal is a 37 y.o. male who presents for annual exam.    He is expected a baby in June, a girl. He is the  of my patient, Kell Taylor. He is working for [employer]. He is routinely active. He reports a healthy diet. Current Outpatient Medications   Medication Sig Dispense Refill    MEN'S MULTI-VITAMIN PO Take  by mouth. Every other day        Allergies   Allergen Reactions    Soy Other (comments)     Diarrhea, gas      Patient Active Problem List   Diagnosis Code    Sinus pressure J34.89    Appendicitis with abscess K35.33    Perforated appendicitis K35.32     No past medical history on file. Past Surgical History:   Procedure Laterality Date    HX ACL RECONSTRUCTION        No LMP for male patient.    Family History   Problem Relation Age of Onset    No Known Problems Mother     No Known Problems Father     No Known Problems Brother     Dementia Maternal Grandmother     No Known Problems Maternal Grandfather     No Known Problems Paternal Grandmother     No Known Problems Paternal Grandfather     Other Paternal Uncle         testicular cancer      Social History     Socioeconomic History    Marital status:      Spouse name: Not on file    Number of children: Not on file    Years of education: Not on file    Highest education level: Not on file   Occupational History    Not on file   Social Needs    Financial resource strain: Not on file    Food insecurity     Worry: Not on file     Inability: Not on file    Transportation needs     Medical: Not on file     Non-medical: Not on file   Tobacco Use    Smoking status: Never Smoker    Smokeless tobacco: Never Used   Substance and Sexual Activity    Alcohol use: Yes     Alcohol/week: 6.0 standard drinks     Types: 4 Cans of beer, 2 Shots of liquor per week     Frequency: 4 or more times a week     Drinks per session: 1 or 2     Comment: 1-2 drinks every 2 nights    Drug use: No    Sexual activity: Yes     Partners: Female     Birth control/protection: None   Lifestyle    Physical activity     Days per week: Not on file     Minutes per session: Not on file    Stress: Not on file   Relationships    Social connections     Talks on phone: Not on file     Gets together: Not on file     Attends Anabaptist service: Not on file     Active member of club or organization: Not on file     Attends meetings of clubs or organizations: Not on file     Relationship status: Not on file    Intimate partner violence     Fear of current or ex partner: Not on file     Emotionally abused: Not on file     Physically abused: Not on file     Forced sexual activity: Not on file   Other Topics Concern    Not on file   Social History Narrative    Not on file        ROS:     ROS    Physical Exam:     Visit Vitals  /75 (BP 1 Location: Right arm, BP Patient Position: Sitting)   Pulse 61   Temp 99 °F (37.2 °C) (Temporal)   Resp 18   Ht 6' 2\" (1.88 m)   Wt 201 lb 3.2 oz (91.3 kg)   SpO2 98%   BMI 25.83 kg/m²        Vital signs and nurse documentation reviewed. Physical Exam  Constitutional:       General: He is not in acute distress. HENT:      Right Ear: No drainage. No middle ear effusion.  Tympanic membrane is not injected, erythematous or bulging. Left Ear: No drainage. No middle ear effusion. Tympanic membrane is not injected, erythematous or bulging. Eyes:      Pupils: Pupils are equal, round, and reactive to light. Neck:      Trachea: No tracheal deviation. Cardiovascular:      Pulses:           Dorsalis pedis pulses are 2+ on the right side and 2+ on the left side. Posterior tibial pulses are 2+ on the right side and 2+ on the left side. Heart sounds: S1 normal and S2 normal. No murmur. No friction rub. No gallop. Pulmonary:      Breath sounds: Normal breath sounds. No wheezing. Abdominal:      General: Bowel sounds are normal. There is no distension. Palpations: Abdomen is soft. There is no mass. Tenderness: There is no abdominal tenderness. Lymphadenopathy:      Cervical: No cervical adenopathy. Skin:     General: Skin is warm and dry. Neurological:      Cranial Nerves: No cranial nerve deficit. Sensory: Sensation is intact. Motor: Motor function is intact.

## 2021-01-20 NOTE — PROGRESS NOTES
Chief Complaint   Patient presents with    Complete Physical    Immunization/Injection     flu vaccine      1. Have you been to the ER, urgent care clinic since your last visit? Hospitalized since your last visit? No     2. Have you seen or consulted any other health care providers outside of the [address] [place] [address] since your last visit? Include any pap smears or colon screening.  No

## 2021-01-22 LAB
ALBUMIN SERPL-MCNC: 4.9 G/DL (ref 4–5)
ALBUMIN/GLOB SERPL: 2 {RATIO} (ref 1.2–2.2)
ALP SERPL-CCNC: 68 IU/L (ref 39–117)
ALT SERPL-CCNC: 42 IU/L (ref 0–44)
AST SERPL-CCNC: 30 IU/L (ref 0–40)
BASOPHILS # BLD AUTO: 0 X10E3/UL (ref 0–0.2)
BASOPHILS NFR BLD AUTO: 0 %
BILIRUB SERPL-MCNC: 0.8 MG/DL (ref 0–1.2)
BUN SERPL-MCNC: 15 MG/DL (ref 6–20)
BUN/CREAT SERPL: 14 (ref 9–20)
CALCIUM SERPL-MCNC: 10.2 MG/DL (ref 8.7–10.2)
CHLORIDE SERPL-SCNC: 102 MMOL/L (ref 96–106)
CHOLEST SERPL-MCNC: 210 MG/DL (ref 100–199)
CO2 SERPL-SCNC: 23 MMOL/L (ref 20–29)
CREAT SERPL-MCNC: 1.05 MG/DL (ref 0.76–1.27)
EOSINOPHIL # BLD AUTO: 0.1 X10E3/UL (ref 0–0.4)
EOSINOPHIL NFR BLD AUTO: 2 %
ERYTHROCYTE [DISTWIDTH] IN BLOOD BY AUTOMATED COUNT: 12 % (ref 11.6–15.4)
GLOBULIN SER CALC-MCNC: 2.4 G/DL (ref 1.5–4.5)
GLUCOSE SERPL-MCNC: 91 MG/DL (ref 65–99)
HCT VFR BLD AUTO: 49.2 % (ref 37.5–51)
HDLC SERPL-MCNC: 53 MG/DL
HGB BLD-MCNC: 17 G/DL (ref 13–17.7)
IMM GRANULOCYTES # BLD AUTO: 0 X10E3/UL (ref 0–0.1)
IMM GRANULOCYTES NFR BLD AUTO: 0 %
INTERPRETATION, 910389: NORMAL
LDLC SERPL CALC-MCNC: 143 MG/DL (ref 0–99)
LYMPHOCYTES # BLD AUTO: 1.8 X10E3/UL (ref 0.7–3.1)
LYMPHOCYTES NFR BLD AUTO: 34 %
MCH RBC QN AUTO: 31.8 PG (ref 26.6–33)
MCHC RBC AUTO-ENTMCNC: 34.6 G/DL (ref 31.5–35.7)
MCV RBC AUTO: 92 FL (ref 79–97)
MONOCYTES # BLD AUTO: 0.5 X10E3/UL (ref 0.1–0.9)
MONOCYTES NFR BLD AUTO: 11 %
NEUTROPHILS # BLD AUTO: 2.7 X10E3/UL (ref 1.4–7)
NEUTROPHILS NFR BLD AUTO: 53 %
PLATELET # BLD AUTO: 183 X10E3/UL (ref 150–450)
POTASSIUM SERPL-SCNC: 4.2 MMOL/L (ref 3.5–5.2)
PROT SERPL-MCNC: 7.3 G/DL (ref 6–8.5)
RBC # BLD AUTO: 5.35 X10E6/UL (ref 4.14–5.8)
SODIUM SERPL-SCNC: 142 MMOL/L (ref 134–144)
TRIGL SERPL-MCNC: 78 MG/DL (ref 0–149)
VLDLC SERPL CALC-MCNC: 14 MG/DL (ref 5–40)
WBC # BLD AUTO: 5.1 X10E3/UL (ref 3.4–10.8)

## 2022-03-19 PROBLEM — J34.89 SINUS PRESSURE: Status: ACTIVE | Noted: 2017-12-11

## 2022-03-19 PROBLEM — K35.33 APPENDICITIS WITH ABSCESS: Status: ACTIVE | Noted: 2019-01-24

## 2022-03-19 PROBLEM — K35.32 PERFORATED APPENDICITIS: Status: ACTIVE | Noted: 2019-01-25

## 2022-04-20 ENCOUNTER — TELEPHONE (OUTPATIENT)
Dept: FAMILY MEDICINE CLINIC | Age: 37
End: 2022-04-20

## 2022-04-20 NOTE — TELEPHONE ENCOUNTER
04/20/22  7:15 PM    On Call Phone Encounter Note    Received message regarding fever and diarrhea. Called patient. He reports having worsening fever and diarrhea since Monday. Advised going to urgent care or ER for further evaluation. He expresses agreement.      Ifrah Cabrera MD
